# Patient Record
Sex: MALE | Race: ASIAN | Employment: FULL TIME | URBAN - METROPOLITAN AREA
[De-identification: names, ages, dates, MRNs, and addresses within clinical notes are randomized per-mention and may not be internally consistent; named-entity substitution may affect disease eponyms.]

---

## 2017-01-03 ENCOUNTER — OFFICE VISIT (OUTPATIENT)
Dept: INTERNAL MEDICINE | Facility: CLINIC | Age: 27
End: 2017-01-03
Payer: COMMERCIAL

## 2017-01-03 VITALS
HEART RATE: 55 BPM | OXYGEN SATURATION: 98 % | TEMPERATURE: 97.8 F | SYSTOLIC BLOOD PRESSURE: 98 MMHG | DIASTOLIC BLOOD PRESSURE: 74 MMHG | BODY MASS INDEX: 24.69 KG/M2 | HEIGHT: 73 IN | RESPIRATION RATE: 16 BRPM | WEIGHT: 186.3 LBS

## 2017-01-03 DIAGNOSIS — R41.840 DECREASED ATTENTION SPAN: ICD-10-CM

## 2017-01-03 DIAGNOSIS — R41.840 CONCENTRATION DEFICIT: Primary | ICD-10-CM

## 2017-01-03 DIAGNOSIS — F43.23 ADJUSTMENT DISORDER WITH MIXED ANXIETY AND DEPRESSED MOOD: ICD-10-CM

## 2017-01-03 DIAGNOSIS — F98.8 ADD (ATTENTION DEFICIT DISORDER): ICD-10-CM

## 2017-01-03 PROCEDURE — 99213 OFFICE O/P EST LOW 20 MIN: CPT | Performed by: NURSE PRACTITIONER

## 2017-01-03 RX ORDER — DEXTROAMPHETAMINE SACCHARATE, AMPHETAMINE ASPARTATE, DEXTROAMPHETAMINE SULFATE AND AMPHETAMINE SULFATE 1.25; 1.25; 1.25; 1.25 MG/1; MG/1; MG/1; MG/1
5 TABLET ORAL 2 TIMES DAILY
Qty: 60 TABLET | Refills: 0 | Status: SHIPPED | OUTPATIENT
Start: 2017-01-03 | End: 2017-02-01

## 2017-01-03 ASSESSMENT — ANXIETY QUESTIONNAIRES
7. FEELING AFRAID AS IF SOMETHING AWFUL MIGHT HAPPEN: NOT AT ALL
2. NOT BEING ABLE TO STOP OR CONTROL WORRYING: SEVERAL DAYS
5. BEING SO RESTLESS THAT IT IS HARD TO SIT STILL: NOT AT ALL
GAD7 TOTAL SCORE: 2
6. BECOMING EASILY ANNOYED OR IRRITABLE: NOT AT ALL
IF YOU CHECKED OFF ANY PROBLEMS ON THIS QUESTIONNAIRE, HOW DIFFICULT HAVE THESE PROBLEMS MADE IT FOR YOU TO DO YOUR WORK, TAKE CARE OF THINGS AT HOME, OR GET ALONG WITH OTHER PEOPLE: NOT DIFFICULT AT ALL
1. FEELING NERVOUS, ANXIOUS, OR ON EDGE: SEVERAL DAYS
3. WORRYING TOO MUCH ABOUT DIFFERENT THINGS: NOT AT ALL

## 2017-01-03 ASSESSMENT — PATIENT HEALTH QUESTIONNAIRE - PHQ9: 5. POOR APPETITE OR OVEREATING: NOT AT ALL

## 2017-01-03 NOTE — MR AVS SNAPSHOT
"              After Visit Summary   1/3/2017    Wander Dinero    MRN: [id number]           Patient Information     Date Of Birth          1990        Visit Information        Provider Department      1/3/2017 7:40 AM Radha Meneses NP [facility]        Today's Diagnoses     Concentration deficit    -  1     Decreased attention Span         Adjustment disorder with mixed anxiety and depressed mood         ADD (attention deficit disorder)           Care Instructions    adderall refills through Sara Meneses CNP          Follow-ups after your visit        Who to contact     If you have questions or need follow up information about today's clinic visit or your schedule please contact [facility] directly at [phone].  Normal or non-critical lab and imaging results will be communicated to you by MyChart, letter or phone within 4 business days after the clinic has received the results. If you do not hear from us within 7 days, please contact the clinic through MyChart or phone. If you have a critical or abnormal lab result, we will notify you by phone as soon as possible.  Submit refill requests through [email or web address] or call your pharmacy and they will forward the refill request to us. Please allow 3 business days for your refill to be completed.          Additional Information About Your Visit        MyChart Information     [email or web address] lets you send messages to your doctor, view your test results, renew your prescriptions, schedule appointments and more. To sign up, go to [email or web address] . Click on \"Log in\" on the left side of the screen, which will take you to the Welcome page. Then click on \"Sign up Now\" on the right side of the page.     You will be asked to enter the access code listed below, as well as some personal information. Please follow the directions to create your username and password.     Your access code is: [email or web address]  Expires: 1/31/2017  8:17 " "AM     Your access code will  in 90 days. If you need help or a new code, please call your [facility] or [phone].        Your Vitals Were     Pulse Temperature Respirations Height BMI (Body Mass Index) Pulse Oximetry    55 97.8  F (36.6  C) (Oral) 16 6' 1\" (1.854 m) 24.58 kg/m2 98%       Blood Pressure from Last 3 Encounters:   17 98/74   16 116/60   16 124/86    Weight from Last 3 Encounters:   17 186 lb 4.8 oz (84.505 kg)   16 185 lb (83.915 kg)   16 184 lb (83.462 kg)              Today, you had the following     No orders found for display         Where to get your medicines      Some of these will need a paper prescription and others can be bought over the counter.  Ask your nurse if you have questions.     Bring a paper prescription for each of these medications    - amphetamine-dextroamphetamine 5 MG per tablet       Primary Care Provider Office Phone # Fax #    TUYET Welch [facility] [phone]       [facility] [address] [place] [address]        Thank you!     Thank you for choosing [facility]  for your care. Our goal is always to provide you with excellent care. Hearing back from our patients is one way we can continue to improve our services. Please take a few minutes to complete the written survey that you may receive in the mail after your visit with us. Thank you!             Your Updated Medication List - Protect others around you: Learn how to safely use, store and throw away your medicines at www.disposemymeds.org.          This list is accurate as of: 1/3/17  8:13 AM.  Always use your most recent med list.                   Brand Name Dispense Instructions for use    amphetamine-dextroamphetamine 5 MG per tablet    ADDERALL    60 tablet    Take 1 tablet (5 mg) by mouth 2 times daily         "

## 2017-01-03 NOTE — PROGRESS NOTES
SUBJECTIVE:                                                    Wander Dinero is a 26 year old male who presents to clinic today for the following health issues:      F/u depression, anxiety, ADHD  Improved on adderall 5 mg BID      Patient Active Problem List   Diagnosis     Foot fracture, left, sequela     Abnormal gait     Aftercare following surgery of the musculoskeletal system     Ankle injury     ADD (attention deficit disorder)     Controlled substance agreement signed     Past Surgical History   Procedure Laterality Date     Ent surgery  2009     wisdom teeth      Arthroscopy ankle, repair ligament Left 6/20/2016     Procedure: ARTHROSCOPY ANKLE, REPAIR LIGAMENT;  Surgeon: Umberto Das DPM;  Location:  OR       Social History   Substance Use Topics     Smoking status: Never Smoker      Smokeless tobacco: Never Used     Alcohol Use: 0.0 oz/week     0 Standard drinks or equivalent per week      Comment: 2 per week     Family History   Problem Relation Age of Onset     Other - See Comments Mother      healthy      Other - See Comments Father      healthy      DIABETES No family hx of      Coronary Artery Disease No family hx of      Hypertension No family hx of      Breast Cancer No family hx of      Colon Cancer No family hx of      Prostate Cancer No family hx of      Other Cancer No family hx of          Current Outpatient Prescriptions   Medication Sig Dispense Refill     amphetamine-dextroamphetamine (ADDERALL) 5 MG per tablet Take 1 tablet (5 mg) by mouth 2 times daily 60 tablet 0     BP Readings from Last 3 Encounters:   01/03/17 98/74   12/02/16 116/60   08/02/16 124/86    Wt Readings from Last 3 Encounters:   01/03/17 186 lb 4.8 oz (84.505 kg)   12/02/16 185 lb (83.915 kg)   09/06/16 184 lb (83.462 kg)                    ROS:  Constitutional, HEENT, cardiovascular, pulmonary, gi and gu systems are negative, except as otherwise noted.    OBJECTIVE:                                               "      BP 98/74 mmHg  Pulse 55  Temp(Src) 97.8  F (36.6  C) (Oral)  Resp 16  Ht 6' 1\" (1.854 m)  Wt 186 lb 4.8 oz (84.505 kg)  BMI 24.58 kg/m2  SpO2 98%  Body mass index is 24.58 kg/(m^2).  GENERAL: healthy, alert and no distress  PSYCH: mentation appears normal, affect normal/bright         ASSESSMENT/PLAN:                                                              ICD-10-CM    1. Concentration deficit R41.840 amphetamine-dextroamphetamine (ADDERALL) 5 MG per tablet   2. Decreased attention Span R41.840 amphetamine-dextroamphetamine (ADDERALL) 5 MG per tablet   3. Adjustment disorder with mixed anxiety and depressed mood F43.23 amphetamine-dextroamphetamine (ADDERALL) 5 MG per tablet   4. ADD (attention deficit disorder) F98.8 amphetamine-dextroamphetamine (ADDERALL) 5 MG per tablet       Patient Instructions   adderall refills through Sara Meneses CNP      A total of 15 minutes was spent with the patient today, with greater than 50% of the visit involving counseling and coordination of care.      Radha Meneses NP  [facility]  "

## 2017-01-03 NOTE — NURSING NOTE
"Chief Complaint   Patient presents with     Refill Request     adderall       Initial BP 98/74 mmHg  Pulse 55  Temp(Src) 97.8  F (36.6  C) (Oral)  Resp 16  Ht 6' 1\" (1.854 m)  Wt 186 lb 4.8 oz (84.505 kg)  BMI 24.58 kg/m2  SpO2 98% Estimated body mass index is 24.58 kg/(m^2) as calculated from the following:    Height as of this encounter: 6' 1\" (1.854 m).    Weight as of this encounter: 186 lb 4.8 oz (84.505 kg).  BP completed using cuff size: regular  "

## 2017-01-04 ASSESSMENT — ANXIETY QUESTIONNAIRES: GAD7 TOTAL SCORE: 2

## 2017-01-04 ASSESSMENT — PATIENT HEALTH QUESTIONNAIRE - PHQ9: SUM OF ALL RESPONSES TO PHQ QUESTIONS 1-9: 2

## 2017-02-01 ENCOUNTER — OFFICE VISIT (OUTPATIENT)
Dept: INTERNAL MEDICINE | Facility: CLINIC | Age: 27
End: 2017-02-01
Payer: COMMERCIAL

## 2017-02-01 VITALS
OXYGEN SATURATION: 100 % | SYSTOLIC BLOOD PRESSURE: 100 MMHG | HEART RATE: 90 BPM | TEMPERATURE: 98.6 F | WEIGHT: 183.7 LBS | RESPIRATION RATE: 16 BRPM | BODY MASS INDEX: 24.34 KG/M2 | DIASTOLIC BLOOD PRESSURE: 70 MMHG | HEIGHT: 73 IN

## 2017-02-01 DIAGNOSIS — R41.840 DECREASED ATTENTION SPAN: ICD-10-CM

## 2017-02-01 DIAGNOSIS — F98.8 ADD (ATTENTION DEFICIT DISORDER): ICD-10-CM

## 2017-02-01 DIAGNOSIS — R41.840 CONCENTRATION DEFICIT: Primary | ICD-10-CM

## 2017-02-01 DIAGNOSIS — F43.23 ADJUSTMENT DISORDER WITH MIXED ANXIETY AND DEPRESSED MOOD: ICD-10-CM

## 2017-02-01 PROCEDURE — 99213 OFFICE O/P EST LOW 20 MIN: CPT | Performed by: NURSE PRACTITIONER

## 2017-02-01 RX ORDER — DEXTROAMPHETAMINE SACCHARATE, AMPHETAMINE ASPARTATE MONOHYDRATE, DEXTROAMPHETAMINE SULFATE AND AMPHETAMINE SULFATE 2.5; 2.5; 2.5; 2.5 MG/1; MG/1; MG/1; MG/1
10 CAPSULE, EXTENDED RELEASE ORAL DAILY
Qty: 30 CAPSULE | Refills: 0 | Status: SHIPPED | OUTPATIENT
Start: 2017-02-01 | End: 2017-03-01

## 2017-02-01 RX ORDER — DEXTROAMPHETAMINE SACCHARATE, AMPHETAMINE ASPARTATE, DEXTROAMPHETAMINE SULFATE AND AMPHETAMINE SULFATE 1.25; 1.25; 1.25; 1.25 MG/1; MG/1; MG/1; MG/1
TABLET ORAL
Qty: 30 TABLET | Refills: 0 | Status: SHIPPED | OUTPATIENT
Start: 2017-02-01 | End: 2017-03-01

## 2017-02-01 NOTE — NURSING NOTE
"Chief Complaint   Patient presents with     RECHECK     Attention Deficit Disorder       Initial /70 mmHg  Pulse 90  Temp(Src) 98.6  F (37  C) (Oral)  Ht 6' 1\" (1.854 m)  Wt 183 lb 11.2 oz (83.326 kg)  BMI 24.24 kg/m2  SpO2 100% Estimated body mass index is 24.24 kg/(m^2) as calculated from the following:    Height as of this encounter: 6' 1\" (1.854 m).    Weight as of this encounter: 183 lb 11.2 oz (83.326 kg).  BP completed using cuff size: large    "

## 2017-02-01 NOTE — MR AVS SNAPSHOT
"              After Visit Summary   2/1/2017    Wander Dinero    MRN: [id number]           Patient Information     Date Of Birth          1990        Visit Information        Provider Department      2/1/2017 7:00 AM Sara Parsons APRN CNP [facility]        Today's Diagnoses     Concentration deficit    -  1     Decreased attention Span         Adjustment disorder with mixed anxiety and depressed mood         ADD (attention deficit disorder)           Care Instructions    Adderall XR 10 mg daily     May take Adderall 5 mg daily in early afternoon if needed     Follow up with any questions or concerns.               Follow-ups after your visit        Who to contact     If you have questions or need follow up information about today's clinic visit or your schedule please contact [facility] directly at [phone].  Normal or non-critical lab and imaging results will be communicated to you by B2B-Centerhart, letter or phone within 4 business days after the clinic has received the results. If you do not hear from us within 7 days, please contact the clinic through B2B-Centerhart or phone. If you have a critical or abnormal lab result, we will notify you by phone as soon as possible.  Submit refill requests through SocialExpress or call your pharmacy and they will forward the refill request to us. Please allow 3 business days for your refill to be completed.          Additional Information About Your Visit        [email or web address] Information     SocialExpress lets you send messages to your doctor, view your test results, renew your prescriptions, schedule appointments and more. To sign up, go to [email or web address] . Click on \"Log in\" on the left side of the screen, which will take you to the Welcome page. Then click on \"Sign up Now\" on the right side of the page.     You will be asked to enter the access code listed below, as well as some personal information. Please follow the directions to create your " "username and password.     Your access code is: [email or web address]  Expires: 2017  7:33 AM     Your access code will  in 90 days. If you need help or a new code, please call your [facility] or [phone].        Care EveryWhere ID     This is your Care EveryWhere ID. This could be used by other organizations to access your [facility] medical records  [id number]        Your Vitals Were     Pulse Temperature Respirations Height BMI (Body Mass Index) Pulse Oximetry    90 98.6  F (37  C) (Oral) 16 6' 1\" (1.854 m) 24.24 kg/m2 100%       Blood Pressure from Last 3 Encounters:   17 100/70   17 98/74   16 116/60    Weight from Last 3 Encounters:   17 183 lb 11.2 oz (83.326 kg)   17 186 lb 4.8 oz (84.505 kg)   16 185 lb (83.915 kg)              Today, you had the following     No orders found for display         Today's Medication Changes          These changes are accurate as of: 17  7:33 AM.  If you have any questions, ask your nurse or doctor.               These medicines have changed or have updated prescriptions.        Dose/Directions    * amphetamine-dextroamphetamine 5 MG per tablet   Commonly known as:  ADDERALL   This may have changed:    - how much to take  - how to take this  - when to take this  - additional instructions   Used for:  Concentration deficit, Decreased attention Span, Adjustment disorder with mixed anxiety and depressed mood, ADD (attention deficit disorder)   Changed by:  Sara Parsons APRN CNP        May take 1 tab in early afternoon if needed   Quantity:  30 tablet   Refills:  0       * amphetamine-dextroamphetamine 10 MG per 24 hr capsule   Commonly known as:  ADDERALL XR   This may have changed:  You were already taking a medication with the same name, and this prescription was added. Make sure you understand how and when to take each.   Used for:  ADD (attention deficit disorder), Adjustment disorder with mixed anxiety and depressed " mood, Decreased attention Span, Concentration deficit   Changed by:  Sara Parsons APRN CNP        Dose:  10 mg   Take 1 capsule (10 mg) by mouth daily   Quantity:  30 capsule   Refills:  0       * Notice:  This list has 2 medication(s) that are the same as other medications prescribed for you. Read the directions carefully, and ask your doctor or other care provider to review them with you.         Where to get your medicines      Some of these will need a paper prescription and others can be bought over the counter.  Ask your nurse if you have questions.     Bring a paper prescription for each of these medications    - amphetamine-dextroamphetamine 10 MG per 24 hr capsule  - amphetamine-dextroamphetamine 5 MG per tablet             Primary Care Provider Office Phone # Fax #    TUYET Welch [phone]       [facility] [address] [facility] [address]        Thank you!     Thank you for choosing [facility]  for your care. Our goal is always to provide you with excellent care. Hearing back from our patients is one way we can continue to improve our services. Please take a few minutes to complete the written survey that you may receive in the mail after your visit with us. Thank you!             Your Updated Medication List - Protect others around you: Learn how to safely use, store and throw away your medicines at www.disposemymeds.org.          This list is accurate as of: 2/1/17  7:33 AM.  Always use your most recent med list.                   Brand Name Dispense Instructions for use    * amphetamine-dextroamphetamine 5 MG per tablet    ADDERALL    30 tablet    May take 1 tab in early afternoon if needed       * amphetamine-dextroamphetamine 10 MG per 24 hr capsule    ADDERALL XR    30 capsule    Take 1 capsule (10 mg) by mouth daily       * Notice:  This list has 2 medication(s) that are the same as other medications prescribed for you.  Read the directions carefully, and ask your doctor or other care provider to review them with you.

## 2017-02-01 NOTE — PATIENT INSTRUCTIONS
Adderall XR 10 mg daily     May take Adderall 5 mg daily in early afternoon if needed     Follow up with any questions or concerns.

## 2017-02-01 NOTE — PROGRESS NOTES
"  SUBJECTIVE:                                                    Wander Dinero is a 26 year old male who presents to clinic today for the following health issues:    Follow up ADD.  Doing well on adderall   December to January 1 in morning and one at 12 and worked well   Curbed appetite  Then about 3-4 weeks taking a little earlier 1130-12    Feels like the morning dose does not last as well     Will try extended release with a boost in afternoon    Problem list and histories reviewed & adjusted, as indicated.  Additional history:     Patient Active Problem List   Diagnosis     Foot fracture, left, sequela     ADD (attention deficit disorder)     Controlled substance agreement signed     Past Surgical History   Procedure Laterality Date     Ent surgery  2009     wisdom teeth      Arthroscopy ankle, repair ligament Left 6/20/2016     Procedure: ARTHROSCOPY ANKLE, REPAIR LIGAMENT;  Surgeon: Umberto Das DPM;  Location:  OR       Social History   Substance Use Topics     Smoking status: Never Smoker      Smokeless tobacco: Never Used     Alcohol Use: 0.0 oz/week     0 Standard drinks or equivalent per week      Comment: 2 per week     Family History   Problem Relation Age of Onset     Other - See Comments Mother      healthy      Other - See Comments Father      healthy      DIABETES No family hx of      Coronary Artery Disease No family hx of      Hypertension No family hx of      Breast Cancer No family hx of      Colon Cancer No family hx of      Prostate Cancer No family hx of      Other Cancer No family hx of            ROS:  Constitutional, HEENT, cardiovascular, pulmonary, GI, , musculoskeletal, neuro, skin, endocrine and psych systems are negative, except as otherwise noted.    OBJECTIVE:                                                    /70 mmHg  Pulse 90  Temp(Src) 98.6  F (37  C) (Oral)  Resp 16  Ht 6' 1\" (1.854 m)  Wt 183 lb 11.2 oz (83.326 kg)  BMI 24.24 kg/m2  SpO2 100%  Body mass " index is 24.24 kg/(m^2).  GENERAL: alert and no distress  RESP: lungs clear to auscultation - no rales, rhonchi or wheezes  CV: regular rate and rhythm  ABDOMEN: soft, nontender,  and bowel sounds normal  MS: no gross musculoskeletal defects noted, no edema  NEURO: Normal strength and tone, mentation intact and speech normal  PSYCH: mentation appears normal, affect normal/bright    Diagnostic Test Results:  none      ASSESSMENT/PLAN:                                                              ICD-10-CM    1. Concentration deficit R41.840 amphetamine-dextroamphetamine (ADDERALL) 5 MG per tablet     amphetamine-dextroamphetamine (ADDERALL XR) 10 MG per 24 hr capsule   2. Decreased attention Span R41.840 amphetamine-dextroamphetamine (ADDERALL) 5 MG per tablet     amphetamine-dextroamphetamine (ADDERALL XR) 10 MG per 24 hr capsule   3. Adjustment disorder with mixed anxiety and depressed mood F43.23 amphetamine-dextroamphetamine (ADDERALL) 5 MG per tablet     amphetamine-dextroamphetamine (ADDERALL XR) 10 MG per 24 hr capsule   4. ADD (attention deficit disorder) F98.8 amphetamine-dextroamphetamine (ADDERALL) 5 MG per tablet     amphetamine-dextroamphetamine (ADDERALL XR) 10 MG per 24 hr capsule       Patient Instructions   Adderall XR 10 mg daily     May take Adderall 5 mg daily in early afternoon if needed     Follow up with any questions or concerns.               TUYET Welch [facility]

## 2017-03-01 ENCOUNTER — OFFICE VISIT (OUTPATIENT)
Dept: INTERNAL MEDICINE | Facility: CLINIC | Age: 27
End: 2017-03-01
Payer: COMMERCIAL

## 2017-03-01 ENCOUNTER — OFFICE VISIT (OUTPATIENT)
Dept: FAMILY MEDICINE | Facility: CLINIC | Age: 27
End: 2017-03-01
Payer: COMMERCIAL

## 2017-03-01 VITALS
DIASTOLIC BLOOD PRESSURE: 60 MMHG | RESPIRATION RATE: 12 BRPM | OXYGEN SATURATION: 97 % | SYSTOLIC BLOOD PRESSURE: 116 MMHG | HEIGHT: 73 IN | BODY MASS INDEX: 24.12 KG/M2 | HEART RATE: 56 BPM | TEMPERATURE: 97.8 F | WEIGHT: 182 LBS

## 2017-03-01 DIAGNOSIS — L70.0 ACNE VULGARIS: ICD-10-CM

## 2017-03-01 DIAGNOSIS — F98.8 ADD (ATTENTION DEFICIT DISORDER): Primary | ICD-10-CM

## 2017-03-01 DIAGNOSIS — B36.0 TINEA VERSICOLOR: ICD-10-CM

## 2017-03-01 DIAGNOSIS — L81.9 HYPERPIGMENTATION OF SKIN: Primary | ICD-10-CM

## 2017-03-01 PROCEDURE — 99213 OFFICE O/P EST LOW 20 MIN: CPT | Performed by: NURSE PRACTITIONER

## 2017-03-01 PROCEDURE — 99213 OFFICE O/P EST LOW 20 MIN: CPT | Performed by: FAMILY MEDICINE

## 2017-03-01 RX ORDER — DEXTROAMPHETAMINE SACCHARATE, AMPHETAMINE ASPARTATE, DEXTROAMPHETAMINE SULFATE AND AMPHETAMINE SULFATE 2.5; 2.5; 2.5; 2.5 MG/1; MG/1; MG/1; MG/1
10 TABLET ORAL 2 TIMES DAILY
Qty: 60 TABLET | Refills: 0 | Status: SHIPPED | OUTPATIENT
Start: 2017-03-01 | End: 2017-03-01

## 2017-03-01 RX ORDER — DEXTROAMPHETAMINE SACCHARATE, AMPHETAMINE ASPARTATE, DEXTROAMPHETAMINE SULFATE AND AMPHETAMINE SULFATE 1.25; 1.25; 1.25; 1.25 MG/1; MG/1; MG/1; MG/1
10 TABLET ORAL 2 TIMES DAILY
Refills: 0 | COMMUNITY
Start: 2017-02-01 | End: 2018-05-23

## 2017-03-01 RX ORDER — ADAPALENE 0.1 G/100G
CREAM TOPICAL AT BEDTIME
Qty: 45 G | Refills: 11 | Status: SHIPPED | OUTPATIENT
Start: 2017-03-01 | End: 2018-05-23

## 2017-03-01 RX ORDER — CLINDAMYCIN PHOSPHATE 10 UG/ML
LOTION TOPICAL 2 TIMES DAILY
Qty: 60 ML | Refills: 3 | Status: SHIPPED | OUTPATIENT
Start: 2017-03-01 | End: 2018-05-23

## 2017-03-01 RX ORDER — CHOLECALCIFEROL (VITAMIN D3) 50 MCG
2000 TABLET ORAL DAILY
Qty: 100 TABLET | Refills: 3 | COMMUNITY
Start: 2017-03-01

## 2017-03-01 RX ORDER — FLUCONAZOLE 150 MG/1
150 TABLET ORAL
Qty: 4 TABLET | Refills: 0 | Status: CANCELLED | OUTPATIENT
Start: 2017-03-01

## 2017-03-01 NOTE — PROGRESS NOTES
[facility] - PRIMARY CARE SKIN    CC : scarring on face  SUBJECTIVE:                                                    Wander Dinero is a 26 year old male who presents to clinic today because of scarring on face, resultant from acne. Acne control is satisfactory at this time, even though he reports constant development of acne on the face. He has picked at pimples on the face.    Symptoms have been ongoing for : years.  The acne is primarily located on the : face.  Acne generally presents as : pimple(s).    Current acne treatment : OTC acne cleanser (with charcoal).    Issue Two : Tinea versicolor  He has also noted blotchy skin discoloration on the back and chest. It is not itchy.    Refer to electronic medical record (EMR) for past medical history and medications.    INTEGUMENTARY/SKIN: POSITIVE for acne, pigmentation  ROS : 14 point review of systems was negative except the symptoms listed above in the HPI.    This document serves as a record of the services and decisions personally performed and made by Archana John MD. It was created on her behalf by Mitesh Vincent, a trained medical scribe.  The creation of this document is based on the scribe's personal observations and the provider's statements to the medical scribe.  Mitesh Vincent, March 1, 2017 1:56 PM      OBJECTIVE:                                                    GENERAL: healthy, alert and no distress  SKIN: [place] Skin Type - V.  Face were examined. The dermatoscope was used to help evaluate pigmented lesions.  Skin Pertinent Findings:  Face: Resolving nodular acneiform lesion on right cheek. Residual hyperpigmentation on upper forehead. Residual scarring from previous acne lesions.    Scattered macular patches of hypopigmentation and hyperpigmentation on back with slight distribution on abdomen and chest.    Diagnostic Test Results:  none     MDM : . Discussed pathophysiology of acne, treatment options, and expectations for treatment  "response.      ASSESSMENT:                                                      Encounter Diagnoses   Name Primary?     Hyperpigmentation of skin Yes     Tinea versicolor      Acne vulgaris          PLAN:                                                    Patient Instructions   FUTURE APPOINTMENTS  Follow up in 2 months for re-evaluation of acne.    Do not pick at acne as that creates more inflammation.    ACNE TREATMENT PLAN  Morning or Evening (whenever you shower) :    OTC cleanser with benzoyl peroxide 5% wash (not gel form). Note - Benzoyl peroxide washes can bleach clothing, so try to use disposable or old towels and clothes.    Do a \"20/10\" wash (20 seconds of skin contact with the cleanser followed by a 10 second rinse)    Apply to face.    After cleansing, medication : apply topical Clindamycin 1% lotion to face.    Evening or Morning (other time of day) :    Cetaphil facial cleanser - Do a \"20 / 10 wash\" again.    Bedtime :    Lastly, before going to bed, apply topical Adapalene 0.1% cream.    Wait until face is dry after cleansing before application.    Use just a pea-sized amount for application.    Recommendations if skin becomes too dry in response to medication:    Use Cetaphil facial moisturizer.    Alternate application of Adapalene cream every other night for 2 weeks, to condition the skin. After 2 weeks, retry nightly application.    Decrease use of Benzaderm or Benzoyl Peroxide wash to once per day. If still too irritating, decrease benzoyl peroxide product to 5%.    Recommended brands include: PanOxyl 4% creamy wash, Clean & Clear Advantage oil absorbing 5.5% cleanser, Neutrogena Clear Pore 3.7% daily scrub, [employer] 4% creamy face wash. Topix Benzaderm (changing name to Replenix) 5% wash can be found on amazon.com.    TINEA VERSICOLOR INSTRUCTIONS  It is very common for this to occur with exercise or hot weather. If this continues to be a problem, Selsun blue, ketoconazole or another shampoo may be " used as a body wash once a week or so to prevent it from coming back.        PROCEDURES:                                                    None.    TT : 25 minutes.  CT : 15 minutes.      The information in this document, created by the medical scribe for me, accurately reflects the services I personally performed and the decisions made by me. I have reviewed and approved this document for accuracy prior to leaving the patient care area.  Archana John MD March 1, 2017 1:56 PM  [facility] - PRIMARY CARE SKIN

## 2017-03-01 NOTE — PATIENT INSTRUCTIONS
"FUTURE APPOINTMENTS  Follow up in 2 months for re-evaluation of acne.    Do not pick at acne as that creates more inflammation.    ACNE TREATMENT PLAN  Morning or Evening (whenever you shower) :    OTC cleanser with benzoyl peroxide 5% wash (not gel form). Note - Benzoyl peroxide washes can bleach clothing, so try to use disposable or old towels and clothes.    Do a \"20/10\" wash (20 seconds of skin contact with the cleanser followed by a 10 second rinse)    Apply to face.    After cleansing, medication : apply topical Clindamycin 1% lotion to face.    Evening or Morning (other time of day) :    Cetaphil facial cleanser - Do a \"20 / 10 wash\" again.    Bedtime :    Lastly, before going to bed, apply topical Adapalene 0.1% cream.    Wait until face is dry after cleansing before application.    Use just a pea-sized amount for application.    Recommendations if skin becomes too dry in response to medication:    Use Cetaphil facial moisturizer.    Alternate application of Adapalene cream every other night for 2 weeks, to condition the skin. After 2 weeks, retry nightly application.    Decrease use of Benzaderm or Benzoyl Peroxide wash to once per day. If still too irritating, decrease benzoyl peroxide product to 5%.    Recommended brands include: PanOxyl 4% creamy wash, Clean & Clear Advantage oil absorbing 5.5% cleanser, Neutrogena Clear Pore 3.7% daily scrub, [employer] 4% creamy face wash. Topix Benzaderm (changing name to Replenix) 5% wash can be found on amazon.com.    TINEA VERSICOLOR INSTRUCTIONS  It is very common for this to occur with exercise or hot weather. If this continues to be a problem, Selsun blue, ketoconazole or another shampoo may be used as a body wash once a week or so to prevent it from coming back.  "

## 2017-03-01 NOTE — MR AVS SNAPSHOT
After Visit Summary   3/1/2017    Wander Dinero    MRN: [id number]           Patient Information     Date Of Birth          1990        Visit Information        Provider Department      3/1/2017 7:20 AM Sara Parsons APRN CNP [facility]        Today's Diagnoses     ADD (attention deficit disorder)    -  1      Care Instructions    Will try adderall immediate release and do 10 mg up to twice daily     Follow up in a month        Follow-ups after your visit        Your next 10 appointments already scheduled     Mar 01, 2017  2:20 PM CST   Office Visit with Nanda John MD   [facility] ([facility] )    [address] [place] [id number] [phone]           Bring a current list of meds and any records pertaining to this visit.  For Physicals, please bring immunization records and any forms needing to be filled out.  Please arrive 10 minutes early to complete paperwork.              Who to contact     If you have questions or need follow up information about today's clinic visit or your schedule please contact [facility] directly at [phone].  Normal or non-critical lab and imaging results will be communicated to you by MyChart, letter or phone within 4 business days after the clinic has received the results. If you do not hear from us within 7 days, please contact the clinic through Xinronghart or phone. If you have a critical or abnormal lab result, we will notify you by phone as soon as possible.  Submit refill requests through Shopatron or call your pharmacy and they will forward the refill request to us. Please allow 3 business days for your refill to be completed.          Additional Information About Your Visit        MyChart Information     Shopatron lets you send messages to your doctor, view your test results, renew your prescriptions, schedule appointments and  "more. To sign up, go to [email or web address] . Click on \"Log in\" on the left side of the screen, which will take you to the Welcome page. Then click on \"Sign up Now\" on the right side of the page.     You will be asked to enter the access code listed below, as well as some personal information. Please follow the directions to create your username and password.     Your access code is: [email or web address]  Expires: 2017  7:33 AM     Your access code will  in 90 days. If you need help or a new code, please call your [facility] clinic or [phone].        Care EveryWhere ID     This is your Care EveryWhere ID. This could be used by other organizations to access your [facility] medical records  [id number]        Your Vitals Were     Pulse Temperature Respirations Height Pulse Oximetry BMI (Body Mass Index)    56 97.8  F (36.6  C) (Oral) 12 6' 1\" (1.854 m) 97% 24.01 kg/m2       Blood Pressure from Last 3 Encounters:   17 116/60   17 100/70   17 98/74    Weight from Last 3 Encounters:   17 182 lb (82.6 kg)   17 183 lb 11.2 oz (83.3 kg)   17 186 lb 4.8 oz (84.5 kg)              Today, you had the following     No orders found for display         Today's Medication Changes          These changes are accurate as of: 3/1/17 11:09 AM.  If you have any questions, ask your nurse or doctor.               Start taking these medicines.        Dose/Directions    amphetamine-dextroamphetamine 10 MG per tablet   Commonly known as:  ADDERALL   Used for:  ADD (attention deficit disorder)   Replaces:  amphetamine-dextroamphetamine 5 MG per tablet   Started by:  Sara Parsons APRN CNP        Dose:  10 mg   Take 1 tablet (10 mg) by mouth 2 times daily Take second dose before 2 pm   Quantity:  60 tablet   Refills:  0         Stop taking these medicines if you haven't already. Please contact your care team if you have questions.     amphetamine-dextroamphetamine 10 MG per 24 hr capsule "   Commonly known as:  ADDERALL XR   Stopped by:  Sara Parsons APRN CNP           amphetamine-dextroamphetamine 5 MG per tablet   Commonly known as:  ADDERALL   Replaced by:  amphetamine-dextroamphetamine 10 MG per tablet   Stopped by:  Sara Parsons APRN CNP                Where to get your medicines      Some of these will need a paper prescription and others can be bought over the counter.  Ask your nurse if you have questions.     Bring a paper prescription for each of these medications     amphetamine-dextroamphetamine 10 MG per tablet                Primary Care Provider Office Phone # Fax #    TUYET Welhc [phone]       [facility] [address] [place] [address]        Thank you!     Thank you for choosing [facility]  for your care. Our goal is always to provide you with excellent care. Hearing back from our patients is one way we can continue to improve our services. Please take a few minutes to complete the written survey that you may receive in the mail after your visit with us. Thank you!             Your Updated Medication List - Protect others around you: Learn how to safely use, store and throw away your medicines at www.disposemymeds.org.          This list is accurate as of: 3/1/17 11:09 AM.  Always use your most recent med list.                   Brand Name Dispense Instructions for use    amphetamine-dextroamphetamine 10 MG per tablet    ADDERALL    60 tablet    Take 1 tablet (10 mg) by mouth 2 times daily Take second dose before 2 pm       vitamin D 2000 UNITS tablet     100 tablet    Take 2,000 Units by mouth daily

## 2017-03-01 NOTE — MR AVS SNAPSHOT
"              After Visit Summary   3/1/2017    Wander Dinero    MRN: [id number]           Patient Information     Date Of Birth          1990        Visit Information        Provider Department      3/1/2017 2:20 PM Nanda John MD [facility]        Today's Diagnoses     Hyperpigmentation of skin    -  1    Tinea versicolor        Acne vulgaris          Care Instructions    FUTURE APPOINTMENTS  Follow up in 2 months for re-evaluation of acne.    Do not pick at acne as that creates more inflammation.    ACNE TREATMENT PLAN  Morning or Evening (whenever you shower) :    OTC cleanser with benzoyl peroxide 5% wash (not gel form). Note - Benzoyl peroxide washes can bleach clothing, so try to use disposable or old towels and clothes.    Do a \"20/10\" wash (20 seconds of skin contact with the cleanser followed by a 10 second rinse)    Apply to face.    After cleansing, medication : apply topical Clindamycin 1% lotion to face.    Evening or Morning (other time of day) :    Cetaphil facial cleanser - Do a \"20 / 10 wash\" again.    Bedtime :    Lastly, before going to bed, apply topical Adapalene 0.1% cream.    Wait until face is dry after cleansing before application.    Use just a pea-sized amount for application.    Recommendations if skin becomes too dry in response to medication:    Use Cetaphil facial moisturizer.    Alternate application of Adapalene cream every other night for 2 weeks, to condition the skin. After 2 weeks, retry nightly application.    Decrease use of Benzaderm or Benzoyl Peroxide wash to once per day. If still too irritating, decrease benzoyl peroxide product to 5%.    Recommended brands include: PanOxyl 4% creamy wash, Clean & Clear Advantage oil absorbing 5.5% cleanser, Neutrogena Clear Pore 3.7% daily scrub, CVS 4% creamy face wash. Topix Benzaderm (changing name to Replenix) 5% wash can be found on amazon.com.    TINEA VERSICOLOR INSTRUCTIONS  It is very " "common for this to occur with exercise or hot weather. If this continues to be a problem, Selsun blue, ketoconazole or another shampoo may be used as a body wash once a week or so to prevent it from coming back.        Follow-ups after your visit        Follow-up notes from your care team     Return in about 2 months (around 5/1/2017).      Your next 10 appointments already scheduled     Mar 01, 2017  2:20 PM CST   Office Visit with Nanda John MD   [facility] ([facility] )    [address] [place] [address]  [address] [place] [id number] [phone]           Bring a current list of meds and any records pertaining to this visit.  For Physicals, please bring immunization records and any forms needing to be filled out.  Please arrive 10 minutes early to complete paperwork.              Who to contact     If you have questions or need follow up information about today's clinic visit or your schedule please contact [facility] directly at [phone].  Normal or non-critical lab and imaging results will be communicated to you by Choisrhart, letter or phone within 4 business days after the clinic has received the results. If you do not hear from us within 7 days, please contact the clinic through TakeCharget or phone. If you have a critical or abnormal lab result, we will notify you by phone as soon as possible.  Submit refill requests through [email or web address] or call your pharmacy and they will forward the refill request to us. Please allow 3 business days for your refill to be completed.          Additional Information About Your Visit        ChoisrharMic Network Information     [email or web address] lets you send messages to your doctor, view your test results, renew your prescriptions, schedule appointments and more. To sign up, go to [email or web address] . Click on \"Log in\" on the left side of the screen, which will take you to the Welcome page. Then click on " "\"Sign up Now\" on the right side of the page.     You will be asked to enter the access code listed below, as well as some personal information. Please follow the directions to create your username and password.     Your access code is: [email or web address]  Expires: 2017  7:33 AM     Your access code will  in 90 days. If you need help or a new code, please call your [facility] clinic or [phone].        Care EveryWhere ID     This is your Care EveryWhere ID. This could be used by other organizations to access your [facility] medical records  [id number]         Blood Pressure from Last 3 Encounters:   17 116/60   17 100/70   17 98/74    Weight from Last 3 Encounters:   17 182 lb (82.6 kg)   17 183 lb 11.2 oz (83.3 kg)   17 186 lb 4.8 oz (84.5 kg)              Today, you had the following     No orders found for display         Today's Medication Changes          These changes are accurate as of: 3/1/17  2:08 PM.  If you have any questions, ask your nurse or doctor.               Start taking these medicines.        Dose/Directions    adapalene 0.1 % cream   Commonly known as:  DIFFERIN   Used for:  Acne vulgaris   Started by:  Nanda John MD        Apply topically At Bedtime   Quantity:  45 g   Refills:  11       clindamycin 1 % lotion   Commonly known as:  CLEOCIN T   Used for:  Acne vulgaris   Started by:  Nanda John MD        Apply topically 2 times daily   Quantity:  60 mL   Refills:  3         These medicines have changed or have updated prescriptions.        Dose/Directions    amphetamine-dextroamphetamine 5 MG per tablet   Commonly known as:  ADDERALL   This may have changed:  Another medication with the same name was removed. Continue taking this medication, and follow the directions you see here.   Changed by:  Nanda John MD        Dose:  10 mg   Take 10 mg by mouth 2 times daily   Refills:  0            Where to get your " medicines      These medications were sent to [id number] - [place], [place] - [address]  [address], [facility] [address]     Phone:  [phone]     adapalene 0.1 % cream    clindamycin 1 % lotion                Primary Care Provider Office Phone # Fax #    Sara Martel Jaqueline APRJACQUI CANDELARIO [phone]       [facility] [address]  [facility] [address]        Thank you!     Thank you for choosing [facility]  for your care. Our goal is always to provide you with excellent care. Hearing back from our patients is one way we can continue to improve our services. Please take a few minutes to complete the written survey that you may receive in the mail after your visit with us. Thank you!             Your Updated Medication List - Protect others around you: Learn how to safely use, store and throw away your medicines at www.disposemymeds.org.          This list is accurate as of: 3/1/17  2:08 PM.  Always use your most recent med list.                   Brand Name Dispense Instructions for use    adapalene 0.1 % cream    DIFFERIN    45 g    Apply topically At Bedtime       amphetamine-dextroamphetamine 5 MG per tablet    ADDERALL     Take 10 mg by mouth 2 times daily       clindamycin 1 % lotion    CLEOCIN T    60 mL    Apply topically 2 times daily       vitamin D 2000 UNITS tablet     100 tablet    Take 2,000 Units by mouth daily

## 2017-03-01 NOTE — PROGRESS NOTES
SUBJECTIVE:                                                    Wander Dinero is a 26 year old male who presents to clinic today for the following health issues:      Medication Followup of Adderall    Taking Medication as prescribed: yes    Taking 10 mg XL in morning  And 5 mg in afternoon      Side Effects:  None    Medication Helping Symptoms:  Not really    Last 2 weeks combined 10 and 5 mg did not work as well as when he did the 5 and 5 mg     Helping some but seemed to taper off sooner than expected     No sleeping issues     Would like to try higher dose of immediate release                Problem list and histories reviewed & adjusted, as indicated.  Additional history:     Patient Active Problem List   Diagnosis     Foot fracture, left, sequela     ADD (attention deficit disorder)     Controlled substance agreement signed     Past Surgical History   Procedure Laterality Date     Ent surgery  2009     wisdom teeth      Arthroscopy ankle, repair ligament Left 6/20/2016     Procedure: ARTHROSCOPY ANKLE, REPAIR LIGAMENT;  Surgeon: Umberto Das DPM;  Location:  OR       Social History   Substance Use Topics     Smoking status: Never Smoker     Smokeless tobacco: Never Used     Alcohol use 0.0 oz/week     0 Standard drinks or equivalent per week      Comment: 2 per week     Family History   Problem Relation Age of Onset     Other - See Comments Mother      healthy      Other - See Comments Father      healthy      DIABETES No family hx of      Coronary Artery Disease No family hx of      Hypertension No family hx of      Breast Cancer No family hx of      Colon Cancer No family hx of      Prostate Cancer No family hx of      Other Cancer No family hx of            Reviewed and updated as needed this visit by clinical staff  Tobacco  Allergies  Meds  Soc Hx      Reviewed and updated as needed this visit by Provider         ROS:  Constitutional, HEENT, cardiovascular, pulmonary, GI, ,  "musculoskeletal, neuro, skin, endocrine and psych systems are negative, except as otherwise noted.    OBJECTIVE:                                                    /60 (BP Location: Left arm, Patient Position: Chair, Cuff Size: Adult Large)  Pulse 56  Temp 97.8  F (36.6  C) (Oral)  Resp 12  Ht 6' 1\" (1.854 m)  Wt 182 lb (82.6 kg)  SpO2 97%  BMI 24.01 kg/m2  Body mass index is 24.01 kg/(m^2).  GENERAL:  alert and no distress  RESP: lungs clear   CV: regular rate and rhythm  MS: no gross musculoskeletal defects noted, no edema  NEURO: Normal strength and tone, mentation intact and speech normal  PSYCH: mentation appears normal, affect normal/bright    Diagnostic Test Results:  none      ASSESSMENT/PLAN:                                                              ICD-10-CM    1. ADD (attention deficit disorder) F98.8 amphetamine-dextroamphetamine (ADDERALL) 10 MG per tablet       Patient Instructions   Will try adderall immediate release and do 10 mg up to twice daily     Follow up in a month      TUYET Welch [facility]    "

## 2017-03-01 NOTE — NURSING NOTE
"Chief Complaint   Patient presents with     Recheck Medication       Initial /60 (BP Location: Left arm, Patient Position: Chair, Cuff Size: Adult Large)  Pulse 56  Temp 97.8  F (36.6  C) (Oral)  Resp 12  Ht 6' 1\" (1.854 m)  Wt 182 lb (82.6 kg)  SpO2 97%  BMI 24.01 kg/m2 Estimated body mass index is 24.01 kg/(m^2) as calculated from the following:    Height as of this encounter: 6' 1\" (1.854 m).    Weight as of this encounter: 182 lb (82.6 kg).  Medication Reconciliation: complete     AGUSTIN Carey      "

## 2017-04-04 DIAGNOSIS — F98.8 ADD (ATTENTION DEFICIT DISORDER): Primary | ICD-10-CM

## 2017-04-04 RX ORDER — DEXTROAMPHETAMINE SACCHARATE, AMPHETAMINE ASPARTATE, DEXTROAMPHETAMINE SULFATE AND AMPHETAMINE SULFATE 2.5; 2.5; 2.5; 2.5 MG/1; MG/1; MG/1; MG/1
10 TABLET ORAL 2 TIMES DAILY
Qty: 60 TABLET | Refills: 0 | Status: SHIPPED | OUTPATIENT
Start: 2017-04-04 | End: 2017-05-23

## 2017-04-04 NOTE — TELEPHONE ENCOUNTER
Reason for Call:  Medication or medication refill:    Do you use a [facility]?  Name of the pharmacy and phone number for the current request:  [facility], [address]    Name of the medication requested: amphetamine-dextroamphetamine (ADDERALL) 5 MG per tablet    Other request: Please call patient once Rx is taken to the pharmacy    Can we leave a detailed message on this number? YES    Phone number patient can be reached at: Cell number on file:    Telephone Information:   Mobile [phone]       Best Time: any    Call taken on 4/4/2017 at 12:54 PM by Nimisha Mejia

## 2017-04-04 NOTE — TELEPHONE ENCOUNTER
Per [facility]:    Adderall 10mg      Last Written Prescription Date:  3-1-17  Last Fill Quantity: 60,   # refills: 0  Last Office Visit with [facility] prescribing provider: 3-1-17  Future Office visit:       Routing refill request to provider for review/approval because:  Drug not on the [facility] refill protocol or controlled substance    Signed CSA form in chart.    Please hand carry to [facility].    Please advise, thanks.

## 2017-05-10 ENCOUNTER — HOSPITAL ENCOUNTER (EMERGENCY)
Facility: CLINIC | Age: 27
Discharge: LEFT BEFORE DISCHARGE | End: 2017-05-10
Admitting: EMERGENCY MEDICINE
Payer: COMMERCIAL

## 2017-05-10 VITALS
TEMPERATURE: 98.3 F | SYSTOLIC BLOOD PRESSURE: 130 MMHG | OXYGEN SATURATION: 99 % | WEIGHT: 180 LBS | DIASTOLIC BLOOD PRESSURE: 66 MMHG | RESPIRATION RATE: 16 BRPM | HEART RATE: 65 BPM | BODY MASS INDEX: 23.75 KG/M2

## 2017-05-10 PROCEDURE — 40000268 ZZH STATISTIC NO CHARGES
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
## 2017-05-23 DIAGNOSIS — F98.8 ADD (ATTENTION DEFICIT DISORDER): Primary | ICD-10-CM

## 2017-05-23 RX ORDER — DEXTROAMPHETAMINE SACCHARATE, AMPHETAMINE ASPARTATE, DEXTROAMPHETAMINE SULFATE AND AMPHETAMINE SULFATE 1.25; 1.25; 1.25; 1.25 MG/1; MG/1; MG/1; MG/1
10 TABLET ORAL 2 TIMES DAILY
Refills: 0 | Status: CANCELLED | OUTPATIENT
Start: 2017-05-23

## 2017-05-23 RX ORDER — DEXTROAMPHETAMINE SACCHARATE, AMPHETAMINE ASPARTATE, DEXTROAMPHETAMINE SULFATE AND AMPHETAMINE SULFATE 2.5; 2.5; 2.5; 2.5 MG/1; MG/1; MG/1; MG/1
10 TABLET ORAL 2 TIMES DAILY
Qty: 60 TABLET | Refills: 0 | Status: SHIPPED | OUTPATIENT
Start: 2017-05-23 | End: 2017-07-03

## 2017-05-23 NOTE — TELEPHONE ENCOUNTER
Amphetamine      Last Written Prescription Date:  4/4/17  Last Fill Quantity: 60,   # refills: 0  Last Office Visit with [facility] prescribing provider: 3/1/17  Future Office visit:       Routing refill request to provider for review/approval because:  Drug not on the [facility] refill protocol or controlled substance

## 2017-07-03 DIAGNOSIS — F98.8 ATTENTION DEFICIT DISORDER, UNSPECIFIED HYPERACTIVITY PRESENCE: Primary | ICD-10-CM

## 2017-07-03 RX ORDER — DEXTROAMPHETAMINE SACCHARATE, AMPHETAMINE ASPARTATE, DEXTROAMPHETAMINE SULFATE AND AMPHETAMINE SULFATE 2.5; 2.5; 2.5; 2.5 MG/1; MG/1; MG/1; MG/1
10 TABLET ORAL 2 TIMES DAILY
Qty: 60 TABLET | Refills: 0 | Status: SHIPPED | OUTPATIENT
Start: 2017-07-03 | End: 2017-08-02

## 2017-07-03 NOTE — TELEPHONE ENCOUNTER
Adderall      Last Written Prescription Date:  5/23/17  Last Fill Quantity: 60,   # refills: 0  Last Office Visit with [facility] provider: 3/1/17  Future Office visit:       Routing refill request to provider for review/approval because:  Drug not on the [facility] refill protocol or controlled substance

## 2017-08-02 DIAGNOSIS — F98.8 ATTENTION DEFICIT DISORDER, UNSPECIFIED HYPERACTIVITY PRESENCE: ICD-10-CM

## 2017-08-02 RX ORDER — DEXTROAMPHETAMINE SACCHARATE, AMPHETAMINE ASPARTATE, DEXTROAMPHETAMINE SULFATE AND AMPHETAMINE SULFATE 2.5; 2.5; 2.5; 2.5 MG/1; MG/1; MG/1; MG/1
10 TABLET ORAL 2 TIMES DAILY
Qty: 60 TABLET | Refills: 0 | Status: SHIPPED | OUTPATIENT
Start: 2017-08-02 | End: 2017-09-05

## 2017-08-02 NOTE — TELEPHONE ENCOUNTER
Adderall 10 mg Tablets      Last Written Prescription Date: 07/03/2017  Last Fill Quantity: 60,  # refills: 0   Last Office Visit with [facility] or [facility] prescribing provider: 03/01/2017    Thanks!    Alia Pranke, Float Technician   [place] [facility]

## 2017-08-14 ENCOUNTER — THERAPY VISIT (OUTPATIENT)
Dept: CHIROPRACTIC MEDICINE | Facility: CLINIC | Age: 27
End: 2017-08-14
Payer: COMMERCIAL

## 2017-08-14 DIAGNOSIS — M99.03 SOMATIC DYSFUNCTION OF LUMBAR REGION: Primary | ICD-10-CM

## 2017-08-14 DIAGNOSIS — M99.01 CERVICAL SEGMENT DYSFUNCTION: ICD-10-CM

## 2017-08-14 DIAGNOSIS — M54.50 ACUTE RIGHT-SIDED LOW BACK PAIN WITHOUT SCIATICA: ICD-10-CM

## 2017-08-14 DIAGNOSIS — M99.02 THORACIC SEGMENT DYSFUNCTION: ICD-10-CM

## 2017-08-14 PROCEDURE — 99203 OFFICE O/P NEW LOW 30 MIN: CPT | Mod: 25 | Performed by: CHIROPRACTOR

## 2017-08-14 PROCEDURE — 98941 CHIROPRACT MANJ 3-4 REGIONS: CPT | Mod: AT | Performed by: CHIROPRACTOR

## 2017-08-14 NOTE — PROGRESS NOTES
.g  Initial Chiropractic Clinic Visit    PCP: Sara Parsons    Wander Dinero is a 27 year old male who is seen  as a self referral presenting with acute lower back pain, upper back, and neck stiffness which started after sitting for prolonged time reading.  Patient reports that the onset was gradual over the last few weeks. When asked, patient denies:, falling, slipping, bending and reaching or sleeping awkwardly. Patient reports having achy stiffness in the neck and mid-back region.  Prior to onset, the patient was able to sit 2 hours without back pain. Patient notes that due to symptoms, they can only sit 1/2 hour. Wander Dinero notes   sitting rated at a 4/10 painful, difficult and prior to this incident it was 0/10.        Injury: No history of trauma or injury    Location of Pain: right sided lower lumbar/sacral pain, stiffness mid-back and lower neck at the following level(s) C6 , T4 , T5 , L5  and Sacrum   Duration of Pain: 1-2 weeks  Rating of Pain at worst: 5/10  Rating of Pain Currently: 4-5/10  Symptoms are better with: rest  Symptoms are worse with: prolonged sitting, driving  Additional Features: Denies LE pain or weakness     Health History  as reported by the patient:    How does the patient rate their own health:   Excellent    Current or past medical history:   No red flags identified    Medical allergies  None    Past Traumas/Surgeries  Orthopedic: ankle surgery June 2016    Family History  This patient has no significant family history    Medications:  none    Occupation:  Student    Primary job tasks:   Computer work and Prolonged sitting    Barriers as home/work:   none    Additional health Issues:     FANNY Viramontes was asked to complete the Oswestry Low Back Disability Index and Monserrat Start Back screening tool.  today in the office.  Disability score: 14%. Keel Start Total Score:1 Sub Score: 0     Review of Systems  Musculoskeletal: as above  Remainder of review of systems is negative  "including constitutional, CV, pulmonary, GI, Skin and Neurologic except as noted in HPI or medical history.    No past medical history on file.  Past Surgical History:   Procedure Laterality Date     ARTHROSCOPY ANKLE, REPAIR LIGAMENT Left 6/20/2016    Procedure: ARTHROSCOPY ANKLE, REPAIR LIGAMENT;  Surgeon: Umberto Das DPM;  Location: [facility] OR     ENT SURGERY  2009    wisdom teeth      Objective  There were no vitals taken for this visit.      GENERAL APPEARANCE: healthy, alert and no distress   GAIT: NORMAL  SKIN: no suspicious lesions or rashes  NEURO: Normal strength and tone, mentation intact and speech normal  PSYCH:  mentation appears normal and affect normal/bright    Low back exam:    Inspection:  \"     no visible deformity in the low back       normal skin\",    ROM:       full flexion-achy stiffness at end range       full extension    Tender:       paraspinal muscles    Non Tender:       remainder of lumbar spine    Strength:       ankle dorsiflexion 5/5       ankle plantarflexion 5/5       dorsiflexion of the great toe 5/5    Reflexes:       patellar (L3, L4) symmetric normal       achilles tendons (S1) symmetric normal    Sensation:      grossly intact throughout lower extremities    Special tests:  Kemps - Right positive, produced LBP and Left negative, SLR - Right negative and Left negative, Slump - Right negative and Left negative and Fabere - Right negative and Left negative    Segmental spinal dysfunction/restrictions found at::  C6 Right rotation restricted  T5 Left rotation restricted  L5 Right rotation restricted  Sacrum Right rotation restricted.    The following soft tissue hypotonicities were observed:Quad lumb: right, referred pain: no  Traps: ache, referred pain: no    Trigger points were found in:Lumbar erector spine, Quad lumb and Traps    Muscle spasm found in:Lumbar erector spine, Quad lumb, T-spine paraspinal and Traps      Radiology:  none    Assessment:    No diagnosis " found.    RX ordered/plan of care  Anticipated outcomes  Possible risks and side effects    After discussing the risk and benefits of care, patient consented to treatment    Prognosis: Good      Patient's condition:  Patient had restrictions pre-manipulation and Patient had decreased motion prior to manipulation    Treatment effectiveness:  Post manipulation there is better intersegmental movement and Patient claims to feel looser post manipulation      Plan:    Procedures:  Evaluation and Management:  72559 Moderate level exam 30 min    CMT:  12133 Chiropractic manipulative treatment 3-4 regions performed   Cervical: Diversified, C5 , C6, Supine  Thoracic: Diversified, T5, T6, Prone  Lumbar: Diversified, L4, L5, Side posture  Pelvis: Drop Table, PSIS Right , Prone    Modalities:  None performed this visit    Therapeutic procedures:  Exercises: Bridging, scapular retraction, pretzel stretch.     Response to Treatment  Patient tolerated the treatment well today.      Goals:  Sit comfortably for 2 hours  Drive comfortably for  2 hours  Duration to achieve goal will be 3 weeks at a frequency of 1x per week    Treatment plan  Evaluation  Spinal Chiropractic Manipulative Therapy:  C6, T5, L5, SIJ  1 X week, once daily  for 1-3 weeks      Recommendations:    Instructions:ice 20 minutes every other hour as needed    Follow-up:  Return to care in 1-2 weeks if symptoms persist.       Discussed the assessment with the patient.      Disclaimer: This note consists of symbols derived from keyboarding, dictation and/or voice recognition software. As a result, there may be errors in the script that have gone undetected. Please consider this when interpreting information found in this chart.

## 2017-09-05 DIAGNOSIS — F98.8 ATTENTION DEFICIT DISORDER, UNSPECIFIED HYPERACTIVITY PRESENCE: ICD-10-CM

## 2017-09-05 RX ORDER — DEXTROAMPHETAMINE SACCHARATE, AMPHETAMINE ASPARTATE, DEXTROAMPHETAMINE SULFATE AND AMPHETAMINE SULFATE 2.5; 2.5; 2.5; 2.5 MG/1; MG/1; MG/1; MG/1
10 TABLET ORAL 2 TIMES DAILY
Qty: 60 TABLET | Refills: 0 | Status: SHIPPED | OUTPATIENT
Start: 2017-09-05 | End: 2017-10-02

## 2017-09-05 NOTE — TELEPHONE ENCOUNTER
Adderall      Last Written Prescription Date:  8/2/17  Last Fill Quantity: 60,   # refills: 0  Last Office Visit with [facility] provider: 3/1/17  Future Office visit:       Routing refill request to provider for review/approval because:  Drug not on the [facility] refill protocol or controlled substance

## 2017-10-02 DIAGNOSIS — F98.8 ATTENTION DEFICIT DISORDER, UNSPECIFIED HYPERACTIVITY PRESENCE: ICD-10-CM

## 2017-10-02 NOTE — TELEPHONE ENCOUNTER
adderall      Last Written Prescription Date:  9/5/17  Last Fill Quantity: 60,   # refills: 0  Last Office Visit with [facility] provider: 3/1/17  Future Office visit:       Routing refill request to provider for review/approval because:  Drug not on the [facility] refill protocol or controlled substance

## 2017-10-03 RX ORDER — DEXTROAMPHETAMINE SACCHARATE, AMPHETAMINE ASPARTATE, DEXTROAMPHETAMINE SULFATE AND AMPHETAMINE SULFATE 2.5; 2.5; 2.5; 2.5 MG/1; MG/1; MG/1; MG/1
10 TABLET ORAL 2 TIMES DAILY
Qty: 60 TABLET | Refills: 0 | Status: SHIPPED | OUTPATIENT
Start: 2017-10-03 | End: 2017-11-06

## 2017-11-06 DIAGNOSIS — F98.8 ATTENTION DEFICIT DISORDER, UNSPECIFIED HYPERACTIVITY PRESENCE: ICD-10-CM

## 2017-11-06 RX ORDER — DEXTROAMPHETAMINE SACCHARATE, AMPHETAMINE ASPARTATE, DEXTROAMPHETAMINE SULFATE AND AMPHETAMINE SULFATE 2.5; 2.5; 2.5; 2.5 MG/1; MG/1; MG/1; MG/1
10 TABLET ORAL 2 TIMES DAILY
Qty: 60 TABLET | Refills: 0 | Status: SHIPPED | OUTPATIENT
Start: 2017-11-06 | End: 2018-01-09

## 2017-11-06 NOTE — TELEPHONE ENCOUNTER
adderall      Last Written Prescription Date:  10/3/17  Last Fill Quantity: 60,   # refills: 0  Future Office visit:     LOV: 3/1/17    Routing refill request to provider for review/approval because:  Drug not on the [facility] or [facility] refill protocol or controlled substance

## 2018-01-09 DIAGNOSIS — F98.8 ATTENTION DEFICIT DISORDER, UNSPECIFIED HYPERACTIVITY PRESENCE: ICD-10-CM

## 2018-01-09 RX ORDER — DEXTROAMPHETAMINE SACCHARATE, AMPHETAMINE ASPARTATE, DEXTROAMPHETAMINE SULFATE AND AMPHETAMINE SULFATE 2.5; 2.5; 2.5; 2.5 MG/1; MG/1; MG/1; MG/1
10 TABLET ORAL 2 TIMES DAILY
Qty: 60 TABLET | Refills: 0 | Status: SHIPPED | OUTPATIENT
Start: 2018-01-09 | End: 2018-02-07

## 2018-01-09 NOTE — TELEPHONE ENCOUNTER
Adderall      Last Written Prescription Date:  11/6/17  Last Fill Quantity: 60,   # refills: 0  Last Office Visit: 11/6/17  Future Office visit:       Routing refill request to provider for review/approval because:  Drug not on the [facility], [facility] or [facility] refill protocol or controlled substance

## 2018-02-07 DIAGNOSIS — F98.8 ATTENTION DEFICIT DISORDER, UNSPECIFIED HYPERACTIVITY PRESENCE: ICD-10-CM

## 2018-02-07 RX ORDER — DEXTROAMPHETAMINE SACCHARATE, AMPHETAMINE ASPARTATE, DEXTROAMPHETAMINE SULFATE AND AMPHETAMINE SULFATE 2.5; 2.5; 2.5; 2.5 MG/1; MG/1; MG/1; MG/1
10 TABLET ORAL 2 TIMES DAILY
Qty: 60 TABLET | Refills: 0 | Status: SHIPPED | OUTPATIENT
Start: 2018-02-07 | End: 2018-03-13

## 2018-02-07 NOTE — TELEPHONE ENCOUNTER
adderall      Last Written Prescription Date:  1/9/18  Last Fill Quantity: 60,   # refills: 0  Last Office Visit: 3/1/17  Future Office visit:       Routing refill request to provider for review/approval because:  Drug not on the [facility], [facility] or [facility] refill protocol or controlled substance

## 2018-03-12 DIAGNOSIS — F98.8 ATTENTION DEFICIT DISORDER, UNSPECIFIED HYPERACTIVITY PRESENCE: Primary | ICD-10-CM

## 2018-03-13 RX ORDER — DEXTROAMPHETAMINE SACCHARATE, AMPHETAMINE ASPARTATE, DEXTROAMPHETAMINE SULFATE AND AMPHETAMINE SULFATE 1.25; 1.25; 1.25; 1.25 MG/1; MG/1; MG/1; MG/1
10 TABLET ORAL 2 TIMES DAILY
Refills: 0 | Status: CANCELLED | OUTPATIENT
Start: 2018-03-13

## 2018-03-13 RX ORDER — DEXTROAMPHETAMINE SACCHARATE, AMPHETAMINE ASPARTATE, DEXTROAMPHETAMINE SULFATE AND AMPHETAMINE SULFATE 2.5; 2.5; 2.5; 2.5 MG/1; MG/1; MG/1; MG/1
10 TABLET ORAL 2 TIMES DAILY
Qty: 60 TABLET | Refills: 0 | Status: SHIPPED | OUTPATIENT
Start: 2018-03-13 | End: 2018-04-11

## 2018-04-11 DIAGNOSIS — F98.8 ATTENTION DEFICIT DISORDER, UNSPECIFIED HYPERACTIVITY PRESENCE: ICD-10-CM

## 2018-04-11 RX ORDER — DEXTROAMPHETAMINE SACCHARATE, AMPHETAMINE ASPARTATE, DEXTROAMPHETAMINE SULFATE AND AMPHETAMINE SULFATE 2.5; 2.5; 2.5; 2.5 MG/1; MG/1; MG/1; MG/1
10 TABLET ORAL 2 TIMES DAILY
Qty: 60 TABLET | Refills: 0 | Status: SHIPPED | OUTPATIENT
Start: 2018-04-11 | End: 2018-05-17

## 2018-05-17 DIAGNOSIS — F98.8 ATTENTION DEFICIT DISORDER, UNSPECIFIED HYPERACTIVITY PRESENCE: ICD-10-CM

## 2018-05-17 RX ORDER — DEXTROAMPHETAMINE SACCHARATE, AMPHETAMINE ASPARTATE, DEXTROAMPHETAMINE SULFATE AND AMPHETAMINE SULFATE 2.5; 2.5; 2.5; 2.5 MG/1; MG/1; MG/1; MG/1
10 TABLET ORAL 2 TIMES DAILY
Qty: 60 TABLET | Refills: 0 | Status: SHIPPED | OUTPATIENT
Start: 2018-05-17 | End: 2018-05-23

## 2018-05-17 NOTE — TELEPHONE ENCOUNTER
Take rx down to [facility] and contact patient.     Adderall 10 mg      Last Written Prescription Date:  4/11/18  Last Fill Quantity: 60,   # refills: 0  Last Office Visit: 3/1/17 but has appt next week with PCP  Future Office visit:    Next 5 appointments (look out 90 days)     May 23, 2018  7:40 AM CDT   SHORT with TUYET Welch CNP   [facility]    [address]  [facility] [id number] [phone]                   Routing refill request to provider for review/approval because:  Drug not on the [facility], [facility] or  Health refill protocol or controlled substance  Signed CSA on file  [place] reviewed:  reviewed- no concerns    [place]  [email or web address]

## 2018-05-23 ENCOUNTER — OFFICE VISIT (OUTPATIENT)
Dept: INTERNAL MEDICINE | Facility: CLINIC | Age: 28
End: 2018-05-23
Payer: COMMERCIAL

## 2018-05-23 VITALS
HEIGHT: 73 IN | BODY MASS INDEX: 23.19 KG/M2 | OXYGEN SATURATION: 100 % | SYSTOLIC BLOOD PRESSURE: 120 MMHG | HEART RATE: 65 BPM | RESPIRATION RATE: 18 BRPM | WEIGHT: 175 LBS | DIASTOLIC BLOOD PRESSURE: 72 MMHG | TEMPERATURE: 98.1 F

## 2018-05-23 DIAGNOSIS — E55.9 VITAMIN D DEFICIENCY: ICD-10-CM

## 2018-05-23 DIAGNOSIS — L70.0 ACNE VULGARIS: ICD-10-CM

## 2018-05-23 DIAGNOSIS — F98.8 ATTENTION DEFICIT DISORDER, UNSPECIFIED HYPERACTIVITY PRESENCE: Primary | ICD-10-CM

## 2018-05-23 LAB
ALBUMIN SERPL-MCNC: 4.4 G/DL (ref 3.4–5)
ALP SERPL-CCNC: 59 U/L (ref 40–150)
ALT SERPL W P-5'-P-CCNC: 30 U/L (ref 0–70)
ANION GAP SERPL CALCULATED.3IONS-SCNC: 7 MMOL/L (ref 3–14)
AST SERPL W P-5'-P-CCNC: 19 U/L (ref 0–45)
BASOPHILS # BLD AUTO: 0 10E9/L (ref 0–0.2)
BASOPHILS NFR BLD AUTO: 0.2 %
BILIRUB SERPL-MCNC: 0.5 MG/DL (ref 0.2–1.3)
BUN SERPL-MCNC: 15 MG/DL (ref 7–30)
CALCIUM SERPL-MCNC: 9.7 MG/DL (ref 8.5–10.1)
CHLORIDE SERPL-SCNC: 105 MMOL/L (ref 94–109)
CO2 SERPL-SCNC: 29 MMOL/L (ref 20–32)
CREAT SERPL-MCNC: 0.98 MG/DL (ref 0.66–1.25)
DEPRECATED CALCIDIOL+CALCIFEROL SERPL-MC: 31 UG/L (ref 20–75)
DIFFERENTIAL METHOD BLD: NORMAL
EOSINOPHIL # BLD AUTO: 0.2 10E9/L (ref 0–0.7)
EOSINOPHIL NFR BLD AUTO: 3.4 %
ERYTHROCYTE [DISTWIDTH] IN BLOOD BY AUTOMATED COUNT: 14.3 % (ref 10–15)
GFR SERPL CREATININE-BSD FRML MDRD: 90 ML/MIN/1.7M2
GLUCOSE SERPL-MCNC: 91 MG/DL (ref 70–99)
HCT VFR BLD AUTO: 44.4 % (ref 40–53)
HGB BLD-MCNC: 14.3 G/DL (ref 13.3–17.7)
LYMPHOCYTES # BLD AUTO: 1.9 10E9/L (ref 0.8–5.3)
LYMPHOCYTES NFR BLD AUTO: 39.9 %
MCH RBC QN AUTO: 26.5 PG (ref 26.5–33)
MCHC RBC AUTO-ENTMCNC: 32.2 G/DL (ref 31.5–36.5)
MCV RBC AUTO: 82 FL (ref 78–100)
MONOCYTES # BLD AUTO: 0.5 10E9/L (ref 0–1.3)
MONOCYTES NFR BLD AUTO: 9.7 %
NEUTROPHILS # BLD AUTO: 2.2 10E9/L (ref 1.6–8.3)
NEUTROPHILS NFR BLD AUTO: 46.8 %
PLATELET # BLD AUTO: 219 10E9/L (ref 150–450)
POTASSIUM SERPL-SCNC: 4.3 MMOL/L (ref 3.4–5.3)
PROT SERPL-MCNC: 7.6 G/DL (ref 6.8–8.8)
RBC # BLD AUTO: 5.4 10E12/L (ref 4.4–5.9)
SODIUM SERPL-SCNC: 141 MMOL/L (ref 133–144)
TSH SERPL DL<=0.005 MIU/L-ACNC: 1.26 MU/L (ref 0.4–4)
WBC # BLD AUTO: 4.7 10E9/L (ref 4–11)

## 2018-05-23 PROCEDURE — 99214 OFFICE O/P EST MOD 30 MIN: CPT | Performed by: NURSE PRACTITIONER

## 2018-05-23 PROCEDURE — 82306 VITAMIN D 25 HYDROXY: CPT | Performed by: NURSE PRACTITIONER

## 2018-05-23 PROCEDURE — 80053 COMPREHEN METABOLIC PANEL: CPT | Performed by: NURSE PRACTITIONER

## 2018-05-23 PROCEDURE — 85025 COMPLETE CBC W/AUTO DIFF WBC: CPT | Performed by: NURSE PRACTITIONER

## 2018-05-23 PROCEDURE — 36415 COLL VENOUS BLD VENIPUNCTURE: CPT | Performed by: NURSE PRACTITIONER

## 2018-05-23 PROCEDURE — 84443 ASSAY THYROID STIM HORMONE: CPT | Performed by: NURSE PRACTITIONER

## 2018-05-23 RX ORDER — DEXTROAMPHETAMINE SACCHARATE, AMPHETAMINE ASPARTATE, DEXTROAMPHETAMINE SULFATE AND AMPHETAMINE SULFATE 5; 5; 5; 5 MG/1; MG/1; MG/1; MG/1
20 TABLET ORAL 2 TIMES DAILY
Qty: 60 TABLET | Refills: 0 | Status: SHIPPED | OUTPATIENT
Start: 2018-05-23 | End: 2018-06-21

## 2018-05-23 RX ORDER — CLINDAMYCIN PHOSPHATE 10 UG/ML
LOTION TOPICAL 2 TIMES DAILY
Qty: 60 ML | Refills: 3 | Status: SHIPPED | OUTPATIENT
Start: 2018-05-23 | End: 2021-02-03

## 2018-05-23 RX ORDER — ADAPALENE 0.1 G/100G
CREAM TOPICAL AT BEDTIME
Qty: 45 G | Refills: 11 | Status: SHIPPED | OUTPATIENT
Start: 2018-05-23 | End: 2021-02-03

## 2018-05-23 NOTE — MR AVS SNAPSHOT
"              After Visit Summary   5/23/2018    Wander Dinero    MRN: [id number]           Patient Information     Date Of Birth          1990        Visit Information        Provider Department      5/23/2018 7:40 AM Sara Parsons APRN CNP [facility]        Today's Diagnoses     Attention deficit disorder, unspecified hyperactivity presence    -  1    Acne vulgaris        Vitamin D deficiency          Care Instructions    Lab in [address]    Increase adderall 20 mg twice daily     Consider restart of vitamin D           Follow-ups after your visit        Who to contact     If you have questions or need follow up information about today's clinic visit or your schedule please contact [facility] directly at [phone].  Normal or non-critical lab and imaging results will be communicated to you by MyChart, letter or phone within 4 business days after the clinic has received the results. If you do not hear from us within 7 days, please contact the clinic through MyChart or phone. If you have a critical or abnormal lab result, we will notify you by phone as soon as possible.  Submit refill requests through [employer] or call your pharmacy and they will forward the refill request to us. Please allow 3 business days for your refill to be completed.          Additional Information About Your Visit        MyChart Information     [employer] lets you send messages to your doctor, view your test results, renew your prescriptions, schedule appointments and more. To sign up, go to [email or web address] . Click on \"Log in\" on the left side of the screen, which will take you to the Welcome page. Then click on \"Sign up Now\" on the right side of the page.     You will be asked to enter the access code listed below, as well as some personal information. Please follow the directions to create your username and password.     Your access code is: [email or web address]  Expires: 8/21/2018  8:24 AM   " "  Your access code will  in 90 days. If you need help or a new code, please call your [facility] clinic or [phone].        Care EveryWhere ID     This is your Care EveryWhere ID. This could be used by other organizations to access your [facility] medical records  [id number]        Your Vitals Were     Pulse Temperature Respirations Height Pulse Oximetry BMI (Body Mass Index)    65 98.1  F (36.7  C) (Oral) 18 6' 1\" (1.854 m) 100% 23.09 kg/m2       Blood Pressure from Last 3 Encounters:   18 120/72   05/10/17 130/66   17 116/60    Weight from Last 3 Encounters:   18 175 lb (79.4 kg)   05/10/17 180 lb (81.6 kg)   17 182 lb (82.6 kg)              We Performed the Following     CBC with platelets differential     Comprehensive metabolic panel     TSH with free T4 reflex     Vitamin D Deficiency          Today's Medication Changes          These changes are accurate as of 18  8:24 AM.  If you have any questions, ask your nurse or doctor.               Start taking these medicines.        Dose/Directions    amphetamine-dextroamphetamine 20 MG per tablet   Commonly known as:  ADDERALL   Used for:  Attention deficit disorder, unspecified hyperactivity presence   Replaces:  amphetamine-dextroamphetamine 10 MG per tablet   Started by:  Sara Parsons APRN CNP        Dose:  20 mg   Take 1 tablet (20 mg) by mouth 2 times daily   Quantity:  60 tablet   Refills:  0         Stop taking these medicines if you haven't already. Please contact your care team if you have questions.     amphetamine-dextroamphetamine 10 MG per tablet   Commonly known as:  ADDERALL   Replaced by:  amphetamine-dextroamphetamine 20 MG per tablet   Stopped by:  Sara Parsons APRN CNP                Where to get your medicines      These medications were sent to [facility] [place], [place] - [facility] [address]  [address] [facility] [address]     Phone:  [phone]     " adapalene 0.1 % cream    clindamycin 1 % lotion         Some of these will need a paper prescription and others can be bought over the counter.  Ask your nurse if you have questions.     Bring a paper prescription for each of these medications     amphetamine-dextroamphetamine 20 MG per tablet                Primary Care Provider Office Phone # Fax #    TUYET Welch [phone]       [address] [facility] [address]        Equal Access to Services     STAR CLARKE : Hadii aad ku hadasho Soomaali, waaxda luqadaha, qaybta kaalmada adeegyada, waxay idiin hayaan adeeg kharash la'charisse . [facility] [phone].    ATENCIÓN: Si habla español, tiene a castillo disposición servicios gratuitos de asistencia lingüística. [facility] [phone].    We comply with applicable federal civil rights laws and [place] laws. We do not discriminate on the basis of race, color, national origin, age, disability, sex, sexual orientation, or gender identity.            Thank you!     Thank you for choosing [facility]  for your care. Our goal is always to provide you with excellent care. Hearing back from our patients is one way we can continue to improve our services. Please take a few minutes to complete the written survey that you may receive in the mail after your visit with us. Thank you!             Your Updated Medication List - Protect others around you: Learn how to safely use, store and throw away your medicines at www.disposemymeds.org.          This list is accurate as of 5/23/18  8:24 AM.  Always use your most recent med list.                   Brand Name Dispense Instructions for use Diagnosis    adapalene 0.1 % cream    DIFFERIN    45 g    Apply topically At Bedtime    Acne vulgaris       amphetamine-dextroamphetamine 20 MG per tablet    ADDERALL    60 tablet    Take 1 tablet (20 mg) by mouth 2 times daily    Attention deficit disorder, unspecified hyperactivity presence        clindamycin 1 % lotion    CLEOCIN T    60 mL    Apply topically 2 times daily    Acne vulgaris       vitamin D 2000 units tablet     100 tablet    Take 2,000 Units by mouth daily

## 2018-05-23 NOTE — PROGRESS NOTES
".  SUBJECTIVE:   Wander Dinero is a 28 year old male who presents to clinic today for the following health issues:    Med check  Wants to increase med if possible.    Would like to increase to 20 mg twice daily      Using acne medication intermittently     Getting  on Saturday               Problem list and histories reviewed & adjusted, as indicated.  Additional history: as documented    Patient Active Problem List   Diagnosis     Foot fracture, left, sequela     ADD (attention deficit disorder)     Controlled substance agreement signed     Past Surgical History:   Procedure Laterality Date     ARTHROSCOPY ANKLE, REPAIR LIGAMENT Left 6/20/2016    Procedure: ARTHROSCOPY ANKLE, REPAIR LIGAMENT;  Surgeon: Umberto Das DPM;  Location: [facility] OR     ENT SURGERY  2009    wisdom teeth        Social History   Substance Use Topics     Smoking status: Never Smoker     Smokeless tobacco: Never Used     Alcohol use 0.0 oz/week     0 Standard drinks or equivalent per week      Comment: 2 per week     Family History   Problem Relation Age of Onset     Other - See Comments Mother      healthy      Other - See Comments Father      healthy      DIABETES No family hx of      Coronary Artery Disease No family hx of      Hypertension No family hx of      Breast Cancer No family hx of      Colon Cancer No family hx of      Prostate Cancer No family hx of      Other Cancer No family hx of            Reviewed and updated as needed this visit by clinical staff  Tobacco  Allergies  Meds  Med Hx  Surg Hx  Fam Hx  Soc Hx      Reviewed and updated as needed this visit by Provider  Allergies         ROS:  Constitutional, HEENT, cardiovascular, pulmonary, gi and gu systems are negative, except as otherwise noted.    OBJECTIVE:     /72 (BP Location: Left arm, Patient Position: Sitting, Cuff Size: Adult Large)  Pulse 65  Temp 98.1  F (36.7  C) (Oral)  Resp 18  Ht 6' 1\" (1.854 m)  Wt 175 lb (79.4 kg)  SpO2 100%  BMI " 23.09 kg/m2  Body mass index is 23.09 kg/(m^2).  GENERAL: alert and no distress  RESP: lungs clear to auscultation - no rales, rhonchi or wheezes  CV: regular rate and rhythm  ABDOMEN: soft, nontender,  and bowel sounds normal  MS: no gross musculoskeletal defects noted, no edema  NEURO: Normal strength and tone, mentation intact and speech normal  PSYCH: mentation appears normal, affect normal/bright    Diagnostic Test Results:  Labs    ASSESSMENT/PLAN:             1. Attention deficit disorder, unspecified hyperactivity presence  Needs increase in medication   - amphetamine-dextroamphetamine (ADDERALL) 20 MG per tablet; Take 1 tablet (20 mg) by mouth 2 times daily  Dispense: 60 tablet; Refill: 0  - CBC with platelets differential  - Comprehensive metabolic panel  - TSH with free T4 reflex    2. Acne vulgaris  Using prn   - adapalene (DIFFERIN) 0.1 % cream; Apply topically At Bedtime  Dispense: 45 g; Refill: 11  - clindamycin (CLEOCIN T) 1 % lotion; Apply topically 2 times daily  Dispense: 60 mL; Refill: 3  - Comprehensive metabolic panel    3. Vitamin D deficiency    - Vitamin D Deficiency    Patient Instructions   Lab in [address]    Increase adderall 20 mg twice daily     Consider restart of vitamin D       TUYET Welch [facility]

## 2018-05-23 NOTE — NURSING NOTE
"Chief Complaint   Patient presents with     Recheck Medication     initial /72 (BP Location: Left arm, Patient Position: Sitting, Cuff Size: Adult Large)  Pulse 65  Temp 98.1  F (36.7  C) (Oral)  Resp 18  Ht 6' 1\" (1.854 m)  Wt 175 lb (79.4 kg)  SpO2 100%  BMI 23.09 kg/m2 Estimated body mass index is 23.09 kg/(m^2) as calculated from the following:    Height as of this encounter: 6' 1\" (1.854 m).    Weight as of this encounter: 175 lb (79.4 kg)..  bp completed using cuff size large  DORENE ANSARI LPN  "

## 2018-06-21 DIAGNOSIS — F98.8 ATTENTION DEFICIT DISORDER, UNSPECIFIED HYPERACTIVITY PRESENCE: ICD-10-CM

## 2018-06-21 RX ORDER — DEXTROAMPHETAMINE SACCHARATE, AMPHETAMINE ASPARTATE, DEXTROAMPHETAMINE SULFATE AND AMPHETAMINE SULFATE 5; 5; 5; 5 MG/1; MG/1; MG/1; MG/1
20 TABLET ORAL 2 TIMES DAILY
Qty: 60 TABLET | Refills: 0 | Status: SHIPPED | OUTPATIENT
Start: 2018-06-21 | End: 2018-07-20

## 2018-06-21 NOTE — TELEPHONE ENCOUNTER
Pt calling for refill on Adderall.  [facility].    Adderall      Last Written Prescription Date:  5-23-18  Last Fill Quantity: 60,   # refills: 0  Last Office Visit: 5-23-18  Future Office visit:       Routing refill request to provider for review/approval because:  Drug not on the [facility], [facility] or  Health refill protocol or controlled substance    Signed CSA form in chart.    [place] reviewed:  reviewed- no concerns    [place]:  [email or web address]    Please advise, thanks.

## 2018-07-20 DIAGNOSIS — F98.8 ATTENTION DEFICIT DISORDER, UNSPECIFIED HYPERACTIVITY PRESENCE: ICD-10-CM

## 2018-07-20 RX ORDER — DEXTROAMPHETAMINE SACCHARATE, AMPHETAMINE ASPARTATE, DEXTROAMPHETAMINE SULFATE AND AMPHETAMINE SULFATE 5; 5; 5; 5 MG/1; MG/1; MG/1; MG/1
20 TABLET ORAL 2 TIMES DAILY
Qty: 60 TABLET | Refills: 0 | Status: SHIPPED | OUTPATIENT
Start: 2018-07-20 | End: 2018-08-20

## 2018-07-20 NOTE — TELEPHONE ENCOUNTER
Please bring written Rx to [facility].     Review of [place] Prescription Monitoring Program (): No concern for abuse or misuse of controlled medications based on this report.

## 2018-07-20 NOTE — TELEPHONE ENCOUNTER
Patient calling for refill on Adderall.  Hand carry rx to  pharmacy.    Adderall      Last Written Prescription Date:  6-21-18  Last Fill Quantity: 60,   # refills: 0  Last Office Visit: 5-23-18  Future Office visit:       Routing refill request to provider for review/approval because:  Drug not on the [facility], [facility] or [facility] refill protocol or controlled substance    Signed CSA form in chart.    [place] reviewed:  reviewed- no concerns    [place]:  [email or web address]    Please advise, thanks.    This request is forwarded to Radha Meneses NP who covers Dr Bernard and Dr Mireles patients with the letters F,H,I,J

## 2018-08-20 DIAGNOSIS — F98.8 ATTENTION DEFICIT DISORDER, UNSPECIFIED HYPERACTIVITY PRESENCE: ICD-10-CM

## 2018-08-20 NOTE — TELEPHONE ENCOUNTER
Adderall      Last Written Prescription Date:  07/20/18  Last Fill Quantity: 60,   # refills: 0  Last Office Visit: 05/23/18  Sara  Future Office visit:       Routing refill request to provider for review/approval because:  Drug not on the [facility], [facility] or [facility] refill protocol or controlled substance

## 2018-08-21 RX ORDER — DEXTROAMPHETAMINE SACCHARATE, AMPHETAMINE ASPARTATE, DEXTROAMPHETAMINE SULFATE AND AMPHETAMINE SULFATE 5; 5; 5; 5 MG/1; MG/1; MG/1; MG/1
20 TABLET ORAL 2 TIMES DAILY
Qty: 60 TABLET | Refills: 0 | Status: SHIPPED | OUTPATIENT
Start: 2018-08-21 | End: 2018-09-20

## 2018-09-20 DIAGNOSIS — F98.8 ATTENTION DEFICIT DISORDER, UNSPECIFIED HYPERACTIVITY PRESENCE: ICD-10-CM

## 2018-09-20 RX ORDER — DEXTROAMPHETAMINE SACCHARATE, AMPHETAMINE ASPARTATE, DEXTROAMPHETAMINE SULFATE AND AMPHETAMINE SULFATE 5; 5; 5; 5 MG/1; MG/1; MG/1; MG/1
20 TABLET ORAL 2 TIMES DAILY
Qty: 60 TABLET | Refills: 0 | Status: SHIPPED | OUTPATIENT
Start: 2018-09-20 | End: 2018-10-22

## 2018-09-20 NOTE — TELEPHONE ENCOUNTER
ADDERALL      Last Written Prescription Date:  08/21/18  Last Fill Quantity: 60,   # refills: 0  Last Office Visit: 05/23/18  Future Office visit:       Routing refill request to provider for review/approval because:  Drug not on the [facility], [facility] or [facility] refill protocol or controlled substance

## 2018-10-22 DIAGNOSIS — F98.8 ATTENTION DEFICIT DISORDER, UNSPECIFIED HYPERACTIVITY PRESENCE: ICD-10-CM

## 2018-10-22 RX ORDER — DEXTROAMPHETAMINE SACCHARATE, AMPHETAMINE ASPARTATE, DEXTROAMPHETAMINE SULFATE AND AMPHETAMINE SULFATE 5; 5; 5; 5 MG/1; MG/1; MG/1; MG/1
20 TABLET ORAL 2 TIMES DAILY
Qty: 60 TABLET | Refills: 0 | Status: SHIPPED | OUTPATIENT
Start: 2018-10-22 | End: 2018-11-25

## 2018-10-22 NOTE — TELEPHONE ENCOUNTER
Adderall refill request. TAKE to [facility] pharmacy. He was advised of this.         Last Written Prescription Date:  9/20/18  Last Fill Quantity: 60,   # refills: 0    Last Office Visit: 5/23/18    Future Office visit:       Routing refill request to provider for review/approval because:  Drug not on the [facility], [facility] or  Health refill protocol or controlled substance    Confirmed [place] , dispensed on 9/21/18 for #60. No concerns.

## 2018-11-25 DIAGNOSIS — F98.8 ATTENTION DEFICIT DISORDER, UNSPECIFIED HYPERACTIVITY PRESENCE: ICD-10-CM

## 2018-11-25 NOTE — TELEPHONE ENCOUNTER
Wander Nunez is requesting his monthly adderall prescription for November. If this is appropriate please have rx printed and signed and one of our staff members will  the hard copy on one of our daily runs.    Thank you,  Lucila Campos & [facility] Staff Technician   [facility]  [email or web address]   Ph#: [phone]   Fax#: [phone]

## 2018-11-26 RX ORDER — DEXTROAMPHETAMINE SACCHARATE, AMPHETAMINE ASPARTATE, DEXTROAMPHETAMINE SULFATE AND AMPHETAMINE SULFATE 5; 5; 5; 5 MG/1; MG/1; MG/1; MG/1
20 TABLET ORAL 2 TIMES DAILY
Qty: 60 TABLET | Refills: 0 | Status: SHIPPED | OUTPATIENT
Start: 2018-11-26 | End: 2018-12-18 | Stop reason: DRUGHIGH

## 2018-11-26 NOTE — TELEPHONE ENCOUNTER
Requested Prescriptions   Pending Prescriptions Disp Refills     amphetamine-dextroamphetamine (ADDERALL) 20 MG tablet  Last Written Prescription Date:  10/22/18  Last Fill Quantity: 30,  # refills: 0   Last office visit: 5/23/2018 with prescribing provider:  Jaqueline Jarvis Office Visit:     60 tablet 0     Sig: Take 1 tablet (20 mg) by mouth 2 times daily    There is no refill protocol information for this order

## 2018-12-18 ENCOUNTER — OFFICE VISIT (OUTPATIENT)
Dept: INTERNAL MEDICINE | Facility: CLINIC | Age: 28
End: 2018-12-18
Payer: COMMERCIAL

## 2018-12-18 VITALS
DIASTOLIC BLOOD PRESSURE: 60 MMHG | RESPIRATION RATE: 12 BRPM | TEMPERATURE: 98.8 F | HEIGHT: 73 IN | WEIGHT: 175 LBS | HEART RATE: 60 BPM | SYSTOLIC BLOOD PRESSURE: 102 MMHG | OXYGEN SATURATION: 98 % | BODY MASS INDEX: 23.19 KG/M2

## 2018-12-18 DIAGNOSIS — Z79.899 CONTROLLED SUBSTANCE AGREEMENT SIGNED: ICD-10-CM

## 2018-12-18 DIAGNOSIS — F98.8 ATTENTION DEFICIT DISORDER, UNSPECIFIED HYPERACTIVITY PRESENCE: Primary | ICD-10-CM

## 2018-12-18 DIAGNOSIS — R05.9 COUGH: ICD-10-CM

## 2018-12-18 DIAGNOSIS — J06.9 UPPER RESPIRATORY TRACT INFECTION, UNSPECIFIED TYPE: ICD-10-CM

## 2018-12-18 PROCEDURE — 99213 OFFICE O/P EST LOW 20 MIN: CPT | Performed by: NURSE PRACTITIONER

## 2018-12-18 PROCEDURE — 99000 SPECIMEN HANDLING OFFICE-LAB: CPT | Performed by: NURSE PRACTITIONER

## 2018-12-18 PROCEDURE — 80307 DRUG TEST PRSMV CHEM ANLYZR: CPT | Mod: 90 | Performed by: NURSE PRACTITIONER

## 2018-12-18 RX ORDER — DEXTROAMPHETAMINE SACCHARATE, AMPHETAMINE ASPARTATE, DEXTROAMPHETAMINE SULFATE AND AMPHETAMINE SULFATE 7.5; 7.5; 7.5; 7.5 MG/1; MG/1; MG/1; MG/1
30 TABLET ORAL 2 TIMES DAILY
Qty: 60 TABLET | Refills: 0 | Status: SHIPPED | OUTPATIENT
Start: 2018-12-18 | End: 2019-01-18

## 2018-12-18 ASSESSMENT — MIFFLIN-ST. JEOR: SCORE: 1812.67

## 2018-12-18 NOTE — PROGRESS NOTES
SUBJECTIVE:   Wander Dinero is a 28 year old male who presents to clinic today for the following health issues:    Cough x one week. Worse at night.   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  Additional history: as documented    Patient Active Problem List   Diagnosis     Foot fracture, left, sequela     ADD (attention deficit disorder)     Controlled substance agreement signed     Past Surgical History:   Procedure Laterality Date     ARTHROSCOPY ANKLE, REPAIR LIGAMENT Left 6/20/2016    Procedure: ARTHROSCOPY ANKLE, REPAIR LIGAMENT;  Surgeon: Umberto Das DPM;  Location: [facility] OR     ENT SURGERY  2009    wisdom teeth        Social History     Tobacco Use     Smoking status: Never Smoker     Smokeless tobacco: Never Used   Substance Use Topics     Alcohol use: Yes     Alcohol/week: 0.0 oz     Comment: 2 per week     Family History   Problem Relation Age of Onset     Other - See Comments Mother         healthy      Other - See Comments Father         healthy      Diabetes No family hx of      Coronary Artery Disease No family hx of      Hypertension No family hx of      Breast Cancer No family hx of      Colon Cancer No family hx of      Prostate Cancer No family hx of      Other Cancer No family hx of            Reviewed and updated as needed this visit by clinical staff  Tobacco  Allergies  Meds  Soc Hx      Reviewed and updated as needed this visit  "by Provider  Allergies         ROS:  Constitutional, HEENT, cardiovascular, pulmonary, gi and gu systems are negative, except as otherwise noted.    OBJECTIVE:     /60   Pulse 60   Temp 98.8  F (37.1  C) (Oral)   Resp 12   Ht 1.854 m (6' 1\")   Wt 79.4 kg (175 lb)   SpO2 98%   BMI 23.09 kg/m    Body mass index is 23.09 kg/m .  GENERAL: alert and no distress  HENT: ear canals and TM's normal, nose and mouth without ulcers or lesions  RESP: lungs clear to auscultation - no rales, rhonchi or wheezes  CV: regular rate and rhythm  PSYCH: mentation appears normal, affect normal/bright    Diagnostic Test Results:  none     ASSESSMENT/PLAN:         1. Attention deficit disorder, unspecified hyperactivity presence  Increase dose   - amphetamine-dextroamphetamine (ADDERALL) 30 MG tablet; Take 1 tablet (30 mg) by mouth 2 times daily  Dispense: 60 tablet; Refill: 0  - Drug  Screen Comprehensive , Urine with Reported Meds (MedTox) (Pain Care Package)    2. Controlled substance agreement signed    - amphetamine-dextroamphetamine (ADDERALL) 30 MG tablet; Take 1 tablet (30 mg) by mouth 2 times daily  Dispense: 60 tablet; Refill: 0  - Drug  Screen Comprehensive , Urine with Reported Meds (MedTox) (Pain Care Package)    3. Upper respiratory tract infection, unspecified type  Viral UPPER RESPIRATORY INFECTION getting better     4. Cough  Discussed OTC cough syrup Delsym prn       Patient Instructions   Adderall 30 mg twice daily     Lab in [address]          [facility]    "

## 2018-12-18 NOTE — LETTER
[facility]  12/18/18    Patient: Wander Dinero  YOB: 1990  Medical Record Number: [id number]  CSN: [id number]                                                                              Non-opioid Controlled Substance Agreement    I understand that my care provider has prescribed a controlled substance to help manage my condition(s). I am taking this medicine to help me function or work. I know this is strong medicine, and that it can cause serious side effects. Controlled substances can be sedating, addicting and may cause a dependency on the drug. They can affect my ability to drive or think, and cause depression. They need to be taken exactly as prescribed. Combining controlled substances with certain medicines or chemicals (such as cocaine, sedatives and tranquilizers, sleeping pills, meth) can be dangerous or even fatal. Also, if I stop controlled substances suddenly, I may have severe withdrawal symptoms.  If not helpful, I may be asked to stop them.    The risks, benefits, and side effects of these medicine(s) were explained to me. I agree that:    1. I will take part in other treatments as advised by my care team. This may be psychiatry or counseling, physical therapy, behavioral therapy, group treatment or a referral to a pain clinic. I will reduce or stop my medicine when my care team tells me to do so.  2. I will take my medicines as prescribed. I will not change the dose or schedule unless my care team tells me to. There will be no refills if I  run out early.   I may be contactedwithout warning and asked to complete a urine drug test or pill count at any time.   3. I will keep all my appointments, and understand this is part of the monitoring of controlled substances. My care team may require an office visit for EVERY controlled substance refill. If I miss appointments or don t follow instructions, my care team may stop my medicine.  4. I will not ask other providers to  prescribe controlled substances, and I will not accept controlled substances from other people. If I need another prescribed controlled substance for a new reason, I will tell my care team within 1 business day.  5. I will use one pharmacy to fill all of my controlled substance prescriptions, and it is up to me to make sure that I do not run out of my medicines on weekends or holidays. If my care team is willing to refill my controlled substance prescription without a visit, I must request refills only during office hours, refills may take up to 3 days to process, and it may take up to 5 to 7 days for my medicine to be mailed and ready at my pharmacy. Prescriptions will not be mailed anywhere except my pharmacy.    6. I am responsible for my prescriptions. If the medicine/prescription is lost or stolen, it will not be replaced. I also agree not to share controlled substance medicines with anyone.              [facility]  12/18/18  Patient:  Wander Dinero  YOB: 1990  Medical Record Number: [id number]  CSN: [id number]    7. I agree to not use ANY illegal or recreational drugs. This includes marijuana, cocaine, bath salts or other drugs. I agree not to use alcohol unless my care team says I may. I agree to give urine samples whenever asked. If I don t give a urine sample, the care team may stop my medicine.    8. If I enroll in the [place] Medical Marijuana program, I will tell my care team. I will also sign an agreement to share my medical records with my care team.    9. I will bring in my list of medicines (or my medicine bottles) each time I come to the clinic.   10. I will tell my care team right away if I become pregnant or have a new medical problem treated outside of my regular clinic.  11. I understand that this medicine can affect my thinking and judgment. It may be unsafe for me to drive, use machinery and do dangerous tasks. I will not do any of these things until I know how the  medicine affects me. If my dose changes, I will wait to see how it affects me. I will contact my care team if I have concerns about medicine side effects.    I understand that if I do not follow any of the conditions above, my prescriptions or treatment may be stopped.      I agree that my provider, clinic care team, and pharmacy may work with any city, state or federal law enforcement agency that investigates the misuse, sale, or other diversion of my controlled medicine. I will allow my provider to discuss my care with or share a copy of this agreement with any other treating provider, pharmacy or emergency room where I receive care. I agree to give up (waive) any right of privacy or confidentiality with respect to these consents.   I have read this agreement and have asked questions about anything I did not understand.    ____________________________________________________    ____________  ________  Patient signature                                                         Date      Time    ____________________________________________________     ____________  ________  Witness                                                          Date      Time    ____________________________________________________  Provider signature

## 2018-12-22 LAB — PAIN DRUG SCR UR W RPTD MEDS: NORMAL

## 2018-12-27 DIAGNOSIS — F98.8 ATTENTION DEFICIT DISORDER, UNSPECIFIED HYPERACTIVITY PRESENCE: Primary | ICD-10-CM

## 2018-12-27 DIAGNOSIS — Z79.899 CONTROLLED SUBSTANCE AGREEMENT SIGNED: ICD-10-CM

## 2018-12-28 DIAGNOSIS — Z79.899 CONTROLLED SUBSTANCE AGREEMENT SIGNED: ICD-10-CM

## 2018-12-28 DIAGNOSIS — F98.8 ATTENTION DEFICIT DISORDER, UNSPECIFIED HYPERACTIVITY PRESENCE: ICD-10-CM

## 2018-12-28 PROCEDURE — 80307 DRUG TEST PRSMV CHEM ANLYZR: CPT | Mod: 90 | Performed by: NURSE PRACTITIONER

## 2018-12-28 PROCEDURE — 99000 SPECIMEN HANDLING OFFICE-LAB: CPT | Performed by: NURSE PRACTITIONER

## 2019-01-18 DIAGNOSIS — Z79.899 CONTROLLED SUBSTANCE AGREEMENT SIGNED: ICD-10-CM

## 2019-01-18 DIAGNOSIS — F98.8 ATTENTION DEFICIT DISORDER, UNSPECIFIED HYPERACTIVITY PRESENCE: ICD-10-CM

## 2019-01-18 RX ORDER — DEXTROAMPHETAMINE SACCHARATE, AMPHETAMINE ASPARTATE, DEXTROAMPHETAMINE SULFATE AND AMPHETAMINE SULFATE 7.5; 7.5; 7.5; 7.5 MG/1; MG/1; MG/1; MG/1
30 TABLET ORAL 2 TIMES DAILY
Qty: 60 TABLET | Refills: 0 | Status: SHIPPED | OUTPATIENT
Start: 2019-01-18 | End: 2019-02-20

## 2019-01-18 NOTE — TELEPHONE ENCOUNTER
Please place prescription in the [facility] pharmacy folder.    Routing refill request to provider for review/approval because:  Drug not on the [facility] refill protocol     Signed CSA form in chart.    [place] reviewed:  reviewed- no concerns    [place]  [email or web address]

## 2019-01-18 NOTE — TELEPHONE ENCOUNTER
Prescription authorized per primary care provider and placed in [facility] folder.

## 2019-01-18 NOTE — TELEPHONE ENCOUNTER
Adderall (unable to order med)      Last Written Prescription Date:  12/18/18  Last Fill Quantity: 60,   # refills: 0  Last Office Visit: 12/18/18  Future Office visit: none      Routing refill request to provider for review/approval because:  Drug not on the [facility], [facility] or [facility] refill protocol or controlled substance

## 2019-02-19 DIAGNOSIS — F98.8 ATTENTION DEFICIT DISORDER, UNSPECIFIED HYPERACTIVITY PRESENCE: ICD-10-CM

## 2019-02-19 DIAGNOSIS — Z79.899 CONTROLLED SUBSTANCE AGREEMENT SIGNED: ICD-10-CM

## 2019-02-20 RX ORDER — DEXTROAMPHETAMINE SACCHARATE, AMPHETAMINE ASPARTATE, DEXTROAMPHETAMINE SULFATE AND AMPHETAMINE SULFATE 7.5; 7.5; 7.5; 7.5 MG/1; MG/1; MG/1; MG/1
30 TABLET ORAL 2 TIMES DAILY
Qty: 60 TABLET | Refills: 0 | Status: SHIPPED | OUTPATIENT
Start: 2019-02-20 | End: 2019-03-19

## 2019-02-20 NOTE — TELEPHONE ENCOUNTER
Pending Prescriptions:                       Disp   Refills    amphetamine-dextroamphetamine (ADDERALL) *60 tab*0            Sig: Take 1 tablet (30 mg) by mouth 2 times daily          Last Written Prescription Date:  01/18/19  Last Fill Quantity: 60,   # refills: 0  Last Office Visit: 12/18/18  Future Office visit:       Routing refill request to provider for review/approval because:  Drug not on the [facility], [facility] or [facility] refill protocol or controlled substance

## 2019-03-18 DIAGNOSIS — Z79.899 CONTROLLED SUBSTANCE AGREEMENT SIGNED: ICD-10-CM

## 2019-03-18 DIAGNOSIS — F98.8 ATTENTION DEFICIT DISORDER, UNSPECIFIED HYPERACTIVITY PRESENCE: ICD-10-CM

## 2019-03-18 NOTE — TELEPHONE ENCOUNTER
Controlled Substance Refill Request for adderall  Problem List Complete:    No     PROVIDER TO CONSIDER COMPLETION OF PROBLEM LIST AND OVERVIEW/CONTROLLED SUBSTANCE AGREEMENT    Last Written Prescription Date:  2/20/18  Last Fill Quantity: 60,   # refills: 0      Last Office Visit with [facility] primary care provider: 12/18/18    Future Office visit:     Controlled substance agreement:   Encounter-Level CSA - 12/02/2016:    Controlled Substance Agreement - Scan on 12/7/2016  7:49 AM: CONTROLLED SUBSTANCE AGREEMENT (below)       Patient-Level CSA:    Controlled Substance Agreement - Opioid - Scan on 12/18/2018  5:30 PM: NON-OPIOID CONTROLLED SUBSTANCE AGREEMENT (below)           Last Urine Drug Screen:   Pain Drug SCR UR W RPTD Meds   Date Value Ref Range Status   12/18/2018 FINAL  Final     Comment:     (Note)  ====================================================================  [facility] COMP DRUG ANALYSIS,UR  ====================================================================  Test                             Result       Flag       Units        Drug Present   Amphetamine                    3761                    ng/mg creat    Amphetamine is available as a schedule II prescription drug.   Gabapentin                     PRESENT                               Acetaminophen                  PRESENT                               Dextromethorphan               PRESENT                               Dextrorphan/Levorphanol        PRESENT                                Dextrorphan is an expected metabolite of dextromethorphan, an    over-the-counter or prescription cough suppressant. Dextrorphan    cannot be distinguished from the scheduled prescription    medication levorphanol by the method used for analysis.   Guaifenesin                    PRESENT                                Guaifenesin may be administered as an over-the-counter or    prescription drug; it may also be present as a breakdown product    of  methocarbamol.  ====================================================================  Test                      Result    Flag   Units      Ref Range        Creatinine              72               mg/dL      >=20            ====================================================================  Declared Medications:  Medication list was not provided.  ====================================================================  For clinical consultation, please call [phone].  ====================================================================  Analysis performed by [facility] [employer] [place] [address]     ,   Comprehen Drug Analysis UR   Date Value Ref Range Status   12/28/2018 FINAL  Final     Comment:     (Note)  ====================================================================  COMPREHENSIVE DRUG ANALYSIS,UR  ====================================================================  Test                             Result       Flag       Units        Drug Present   Amphetamine                    >6944                   ng/mg creat    Amphetamine is available as a schedule II prescription drug.  ====================================================================  Test                      Result    Flag   Units      Ref Range        Creatinine              144              mg/dL      >=20            ====================================================================  For clinical consultation, please call [phone].  ====================================================================  Analysis performed by [facility] [place] [address]     , No results found for: THC13, PCP13, COC13, MAMP13, OPI13, AMP13, BZO13, TCA13, MTD13, BAR13, OXY13, PPX13, BUP13     Processing:  Staff will hand deliver Rx to on-site pharmacy     [email or web address]       checked in past 3 months?  No, route to RN

## 2019-03-19 RX ORDER — DEXTROAMPHETAMINE SACCHARATE, AMPHETAMINE ASPARTATE, DEXTROAMPHETAMINE SULFATE AND AMPHETAMINE SULFATE 7.5; 7.5; 7.5; 7.5 MG/1; MG/1; MG/1; MG/1
30 TABLET ORAL 2 TIMES DAILY
Qty: 60 TABLET | Refills: 0 | Status: SHIPPED | OUTPATIENT
Start: 2019-03-19 | End: 2019-04-23

## 2019-04-10 ENCOUNTER — THERAPY VISIT (OUTPATIENT)
Dept: CHIROPRACTIC MEDICINE | Facility: CLINIC | Age: 29
End: 2019-04-10
Payer: COMMERCIAL

## 2019-04-10 DIAGNOSIS — M54.2 CERVICALGIA: ICD-10-CM

## 2019-04-10 DIAGNOSIS — G89.29 CHRONIC BILATERAL LOW BACK PAIN WITHOUT SCIATICA: ICD-10-CM

## 2019-04-10 DIAGNOSIS — M99.02 THORACIC SEGMENT DYSFUNCTION: ICD-10-CM

## 2019-04-10 DIAGNOSIS — M99.07 SOMATIC DYSFUNCTION OF UPPER EXTREMITIES: ICD-10-CM

## 2019-04-10 DIAGNOSIS — M99.03 SOMATIC DYSFUNCTION OF LUMBAR REGION: Primary | ICD-10-CM

## 2019-04-10 DIAGNOSIS — M54.50 CHRONIC BILATERAL LOW BACK PAIN WITHOUT SCIATICA: ICD-10-CM

## 2019-04-10 DIAGNOSIS — M25.512 LEFT SHOULDER PAIN: ICD-10-CM

## 2019-04-10 PROCEDURE — 99213 OFFICE O/P EST LOW 20 MIN: CPT | Mod: 25 | Performed by: CHIROPRACTOR

## 2019-04-10 PROCEDURE — 97110 THERAPEUTIC EXERCISES: CPT | Performed by: CHIROPRACTOR

## 2019-04-10 PROCEDURE — 98943 CHIROPRACT MANJ XTRSPINL 1/>: CPT | Mod: 51 | Performed by: CHIROPRACTOR

## 2019-04-10 PROCEDURE — 98941 CHIROPRACT MANJ 3-4 REGIONS: CPT | Mod: AT | Performed by: CHIROPRACTOR

## 2019-04-10 NOTE — PROGRESS NOTES
.g  Initial Chiropractic Clinic Visit    PCP: Sara Parsons    Wander Dinero is a 28 year old male who is seen  as a self referral presenting with lower back pain, upper back, and neck stiffness, left shoulder pain/stiffness which started after sitting for prolonged time computer work and studying.  Patient reports that the onset was gradual over the last few weeks. When asked, patient denies:, falling, slipping, bending and reaching or sleeping awkwardly. Patient reports having achy stiffness in the neck and mid-back region.  Prior to onset, the patient was able to sit 2 hours without back pain. Patient notes that due to symptoms, they can only sit 1/2 hour. Wander Dinero notes   sitting rated at a 4/10 painful, difficult and prior to this incident it was 0/10.        Injury: No history of trauma or injury    Location of Pain: right sided lower lumbar/sacral pain, stiffness mid-back and lower neck at the following level(s) C6 , T4 , T5 , L5  and Sacrum   Duration of Pain: 1 month  Rating of Pain at worst: 6/10  Rating of Pain Currently: 4-5/10  Symptoms are better with: rest  Symptoms are worse with: prolonged sitting, driving  Additional Features: Denies LE pain or weakness     Health History  as reported by the patient:    How does the patient rate their own health:   Excellent    Current or past medical history:   No red flags identified    Medical allergies  None    Past Traumas/Surgeries  Orthopedic: ankle surgery June 2016    Family History  This patient has no significant family history    Medications:  none    Occupation:  Student    Primary job tasks:   Computer work and Prolonged sitting    Barriers as home/work:   none    Additional health Issues:     FANNY Viramontes was asked to complete the Oswestry Low Back Disability Index and Monserrat Start Back screening tool.  today in the office.  Disability score: 14%. Keel Start Total Score:1 Sub Score: 0     Review of Systems  Musculoskeletal: as  "above  Remainder of review of systems is negative including constitutional, CV, pulmonary, GI, Skin and Neurologic except as noted in HPI or medical history.    Past Medical History:   Diagnosis Date     Controlled substance agreement signed 12/18/2018     resigned       Past Surgical History:   Procedure Laterality Date     ARTHROSCOPY ANKLE, REPAIR LIGAMENT Left 6/20/2016    Procedure: ARTHROSCOPY ANKLE, REPAIR LIGAMENT;  Surgeon: Umberto Das DPM;  Location: [facility] OR     ENT SURGERY  2009    wisdom teeth      Objective  There were no vitals taken for this visit.      GENERAL APPEARANCE: healthy, alert and no distress   GAIT: NORMAL  SKIN: no suspicious lesions or rashes  NEURO: Normal strength and tone, mentation intact and speech normal  PSYCH:  mentation appears normal and affect normal/bright      Cervical exam:  CROM: active-slight reduction and mild pain in flexion and right and left rotation, all other motions full and pain free    SHOULDER ROM:  Left shoulder-mild pain and slight reduction in external rotation and abuction    ORTHO: cervical compression: Neg, foraminal compression left and right-Neg    Hypertonic Muscles: pectoralis, scalenes, upper traps    NEURO: Neurological testing of the upper extremities reveals bilateral upper extremity DTR's to be +2, equal and reactive. Bilateral upper extremity strength was found be equal and graded.+5/5. In addition, upper extremity sensation was found to be equal as tested by pinwheel and light touch.        Low back exam:    Inspection:  \"     no visible deformity in the low back       normal skin\",    ROM:       full flexion-achy stiffness at end range       full extension    Tender:       paraspinal muscles    Non Tender:       remainder of lumbar spine    Strength:       ankle dorsiflexion 5/5       ankle plantarflexion 5/5       dorsiflexion of the great toe 5/5    Reflexes:       patellar (L3, L4) symmetric normal       achilles tendons (S1) " symmetric normal    Sensation:      grossly intact throughout lower extremities    Special tests:  Kemps - Right positive, produced LBP and Left negative, SLR - Right negative and Left negative, Slump - Right negative and Left negative and Fabere - Right negative and Left negative    Segmental spinal dysfunction/restrictions found at::  C6 Right rotation restricted  T5 Left rotation restricted  L5 Right rotation restricted  Sacrum Right rotation restricted.    The following soft tissue hypotonicities were observed:Quad lumb: right, referred pain: no  Traps: ache, referred pain: no    Trigger points were found in:Lumbar erector spine, Quad lumb and Traps    Muscle spasm found in:Lumbar erector spine, Quad lumb, T-spine paraspinal and Traps      Radiology:  none    Assessment:    No diagnosis found.    RX ordered/plan of care  Anticipated outcomes  Possible risks and side effects    After discussing the risk and benefits of care, patient consented to treatment    Prognosis: Good      Patient's condition:  Patient had restrictions pre-manipulation and Patient had decreased motion prior to manipulation    Treatment effectiveness:  Post manipulation there is better intersegmental movement and Patient claims to feel looser post manipulation      Plan:    Procedures:  Evaluation and Management:  16150 Moderate level exam 30 min    CMT:  42043 Chiropractic manipulative treatment 3-4 regions performed   Cervical: Diversified, C5 , C6, Supine  Thoracic: Diversified, T5, T6, Prone  Lumbar: Diversified, L4, L5, Side posture  Pelvis: Drop Table, PSIS Right , Prone    Modalities:  None performed this visit    Therapeutic procedures:  Therapeutic exercises: wall stretch, pectoralis stretch, hamstring stretch. Spent 10 minutes on this activity. [email or web address] was given.    Response to Treatment  Patient tolerated the treatment well today.      Goals:  Sit comfortably for 2 hours  Drive comfortably for  2 hours  Duration to achieve goal  will be 3 weeks at a frequency of 1x per week    Treatment plan  Evaluation  Spinal Chiropractic Manipulative Therapy:  C6, T5, L5, SIJ  1 X week, once daily  for 1-3 weeks      Recommendations:    Instructions:ice 20 minutes every other hour as needed    Follow-up:  Return to care in 1-2 weeks if symptoms persist.       Discussed the assessment with the patient.      Disclaimer: This note consists of symbols derived from keyboarding, dictation and/or voice recognition software. As a result, there may be errors in the script that have gone undetected. Please consider this when interpreting information found in this chart.

## 2019-04-23 DIAGNOSIS — F98.8 ATTENTION DEFICIT DISORDER, UNSPECIFIED HYPERACTIVITY PRESENCE: ICD-10-CM

## 2019-04-23 DIAGNOSIS — Z79.899 CONTROLLED SUBSTANCE AGREEMENT SIGNED: ICD-10-CM

## 2019-04-23 RX ORDER — DEXTROAMPHETAMINE SACCHARATE, AMPHETAMINE ASPARTATE, DEXTROAMPHETAMINE SULFATE AND AMPHETAMINE SULFATE 7.5; 7.5; 7.5; 7.5 MG/1; MG/1; MG/1; MG/1
30 TABLET ORAL 2 TIMES DAILY
Qty: 60 TABLET | Refills: 0 | Status: SHIPPED | OUTPATIENT
Start: 2019-04-23 | End: 2019-05-22

## 2019-04-23 NOTE — TELEPHONE ENCOUNTER
Controlled Substance Refill Request for adderall  Problem List Complete:    No     PROVIDER TO CONSIDER COMPLETION OF PROBLEM LIST AND OVERVIEW/CONTROLLED SUBSTANCE AGREEMENT    Last Written Prescription Date:  3/19/19  Last Fill Quantity: 60,   # refills: 0    Last Office Visit with [facility] [place] care provider: 12/18/18 Jaqueline    Future Office visit:     Controlled substance agreement:   Encounter-Level CSA - 12/02/2016:    Controlled Substance Agreement - Scan on 12/7/2016  7:49 AM: CONTROLLED SUBSTANCE AGREEMENT (below)       Patient-Level CSA:    Controlled Substance Agreement - Opioid - Scan on 12/18/2018  5:30 PM: NON-OPIOID CONTROLLED SUBSTANCE AGREEMENT (below)           Last Urine Drug Screen:   Pain Drug SCR UR W RPTD Meds   Date Value Ref Range Status   12/18/2018 FINAL  Final     Comment:     (Note)  ====================================================================  [facility] COMP DRUG ANALYSIS,UR  ====================================================================  Test                             Result       Flag       Units        Drug Present   Amphetamine                    3761                    ng/mg creat    Amphetamine is available as a schedule II prescription drug.   Gabapentin                     PRESENT                               Acetaminophen                  PRESENT                               Dextromethorphan               PRESENT                               Dextrorphan/Levorphanol        PRESENT                                Dextrorphan is an expected metabolite of dextromethorphan, an    over-the-counter or prescription cough suppressant. Dextrorphan    cannot be distinguished from the scheduled prescription    medication levorphanol by the method used for analysis.   Guaifenesin                    PRESENT                                Guaifenesin may be administered as an over-the-counter or    prescription drug; it may also be present as a breakdown product    of  methocarbamol.  ====================================================================  Test                      Result    Flag   Units      Ref Range        Creatinine              72               mg/dL      >=20            ====================================================================  Declared Medications:  Medication list was not provided.  ====================================================================  For clinical consultation, please call [phone].  ====================================================================  Analysis performed by [employer] [place] [address]     ,   Comprehen Drug Analysis UR   Date Value Ref Range Status   12/28/2018 FINAL  Final     Comment:     (Note)  ====================================================================  COMPREHENSIVE DRUG ANALYSIS,UR  ====================================================================  Test                             Result       Flag       Units        Drug Present   Amphetamine                    >6944                   ng/mg creat    Amphetamine is available as a schedule II prescription drug.  ====================================================================  Test                      Result    Flag   Units      Ref Range        Creatinine              144              mg/dL      >=20            ====================================================================  For clinical consultation, please call [phone].  ====================================================================  Analysis performed by [employer] [place] [address]     , No results found for: THC13, PCP13, COC13, MAMP13, OPI13, AMP13, BZO13, TCA13, MTD13, BAR13, OXY13, PPX13, BUP13     Processing:  Staff will hand deliver Rx to on-site pharmacy     [email or web address]       checked in past 3 months?  No, route to RN

## 2019-05-22 DIAGNOSIS — Z79.899 CONTROLLED SUBSTANCE AGREEMENT SIGNED: ICD-10-CM

## 2019-05-22 DIAGNOSIS — F98.8 ATTENTION DEFICIT DISORDER, UNSPECIFIED HYPERACTIVITY PRESENCE: ICD-10-CM

## 2019-05-22 RX ORDER — DEXTROAMPHETAMINE SACCHARATE, AMPHETAMINE ASPARTATE, DEXTROAMPHETAMINE SULFATE AND AMPHETAMINE SULFATE 7.5; 7.5; 7.5; 7.5 MG/1; MG/1; MG/1; MG/1
30 TABLET ORAL 2 TIMES DAILY
Qty: 60 TABLET | Refills: 0 | Status: SHIPPED | OUTPATIENT
Start: 2019-05-22 | End: 2019-06-23

## 2019-05-22 NOTE — TELEPHONE ENCOUNTER
Controlled Substance Refill Request for amphetamine-dextroamphetamine (ADDERALL) 30 MG tablet  Problem List Complete:    Yes    Last Written Prescription Date:  4/23/19  Last Fill Quantity: 60,   # refills: 0  Last Office Visit with [facility] [place] provider: 12/18/18    Future Office visit:     Controlled substance agreement:   Encounter-Level CSA - 12/02/2016:    Controlled Substance Agreement - Scan on 12/7/2016  7:49 AM: CONTROLLED SUBSTANCE AGREEMENT (below)       Patient-Level CSA:    Controlled Substance Agreement - Opioid - Scan on 12/18/2018  5:30 PM: NON-OPIOID CONTROLLED SUBSTANCE AGREEMENT (below)           Last Urine Drug Screen:   Pain Drug SCR UR W RPTD Meds   Date Value Ref Range Status   12/18/2018 FINAL  Final     Comment:     (Note)  ====================================================================  TOXASSURE COMP DRUG ANALYSIS,UR  ====================================================================  Test                             Result       Flag       Units        Drug Present   Amphetamine                    3761                    ng/mg creat    Amphetamine is available as a schedule II prescription drug.   Gabapentin                     PRESENT                               Acetaminophen                  PRESENT                               Dextromethorphan               PRESENT                               Dextrorphan/Levorphanol        PRESENT                                Dextrorphan is an expected metabolite of dextromethorphan, an    over-the-counter or prescription cough suppressant. Dextrorphan    cannot be distinguished from the scheduled prescription    medication levorphanol by the method used for analysis.   Guaifenesin                    PRESENT                                Guaifenesin may be administered as an over-the-counter or    prescription drug; it may also be present as a breakdown product    of  methocarbamol.  ====================================================================  Test                      Result    Flag   Units      Ref Range        Creatinine              72               mg/dL      >=20            ====================================================================  Declared Medications:  Medication list was not provided.  ====================================================================  For clinical consultation, please call [phone].  ====================================================================  Analysis performed by [employer] [place] [address]     ,   Comprehen Drug Analysis UR   Date Value Ref Range Status   12/28/2018 FINAL  Final     Comment:     (Note)  ====================================================================  COMPREHENSIVE DRUG ANALYSIS,UR  ====================================================================  Test                             Result       Flag       Units        Drug Present   Amphetamine                    >6944                   ng/mg creat    Amphetamine is available as a schedule II prescription drug.  ====================================================================  Test                      Result    Flag   Units      Ref Range        Creatinine              144              mg/dL      >=20            ====================================================================  For clinical consultation, please call [phone].  ====================================================================  Analysis performed by [employer] [place] [address]     , No results found for: THC13, PCP13, COC13, MAMP13, OPI13, AMP13, BZO13, TCA13, MTD13, BAR13, OXY13, PPX13, BUP13     Processing:  Staff will hand deliver Rx to on-site pharmacy    [email or web address]    [place] reviewed:  reviewed- no concerns

## 2019-06-19 DIAGNOSIS — F98.8 ATTENTION DEFICIT DISORDER, UNSPECIFIED HYPERACTIVITY PRESENCE: ICD-10-CM

## 2019-06-19 DIAGNOSIS — Z79.899 CONTROLLED SUBSTANCE AGREEMENT SIGNED: ICD-10-CM

## 2019-06-19 NOTE — TELEPHONE ENCOUNTER
ADDERALL      Last Written Prescription Date:  05/22/19  Last Fill Quantity: 60,   # refills: 0  Last Office Visit: 12/18/18  Future Office visit:       Routing refill request to provider for review/approval because:  Drug not on the [facility], [facility] or [facility] refill protocol or controlled substance

## 2019-06-23 RX ORDER — DEXTROAMPHETAMINE SACCHARATE, AMPHETAMINE ASPARTATE, DEXTROAMPHETAMINE SULFATE AND AMPHETAMINE SULFATE 7.5; 7.5; 7.5; 7.5 MG/1; MG/1; MG/1; MG/1
30 TABLET ORAL 2 TIMES DAILY
Qty: 60 TABLET | Refills: 0 | Status: SHIPPED | OUTPATIENT
Start: 2019-06-23 | End: 2019-07-22

## 2019-07-22 DIAGNOSIS — F98.8 ATTENTION DEFICIT DISORDER, UNSPECIFIED HYPERACTIVITY PRESENCE: ICD-10-CM

## 2019-07-22 DIAGNOSIS — Z79.899 CONTROLLED SUBSTANCE AGREEMENT SIGNED: ICD-10-CM

## 2019-07-22 NOTE — TELEPHONE ENCOUNTER
Controlled Substance Refill Request for Adderall 30mg  Problem List Complete:    No     PROVIDER TO CONSIDER COMPLETION OF PROBLEM LIST AND OVERVIEW/CONTROLLED SUBSTANCE AGREEMENT    Last Written Prescription Date:  6-23-19  Last Fill Quantity: 60,   # refills: 0    THE MOST RECENT OFFICE VISIT MUST BE WITHIN THE PAST 3 MONTHS. AT LEAST ONE FACE TO FACE VISIT MUST OCCUR EVERY 6 MONTHS. ADDITIONAL VISITS CAN BE VIRTUAL.  (THIS STATEMENT SHOULD BE DELETED.)    Last Office Visit with [facility] [place] primary care provider: 12-18-18    Future Office visit:     Controlled substance agreement:   Encounter-Level CSA - 12/02/2016:    Controlled Substance Agreement - Scan on 12/7/2016  7:49 AM: CONTROLLED SUBSTANCE AGREEMENT (below)       Patient-Level CSA:    Controlled Substance Agreement - Opioid - Scan on 12/18/2018  5:30 PM: NON-OPIOID CONTROLLED SUBSTANCE AGREEMENT (below)           Last Urine Drug Screen:   Pain Drug SCR UR W RPTD Meds   Date Value Ref Range Status   12/18/2018 FINAL  Final     Comment:     (Note)  ====================================================================  TOXASSURE COMP DRUG ANALYSIS,UR  ====================================================================  Test                             Result       Flag       Units        Drug Present   Amphetamine                    3761                    ng/mg creat    Amphetamine is available as a schedule II prescription drug.   Gabapentin                     PRESENT                               Acetaminophen                  PRESENT                               Dextromethorphan               PRESENT                               Dextrorphan/Levorphanol        PRESENT                                Dextrorphan is an expected metabolite of dextromethorphan, an    over-the-counter or prescription cough suppressant. Dextrorphan    cannot be distinguished from the scheduled prescription    medication levorphanol by the method used for analysis.   Guaifenesin                     PRESENT                                Guaifenesin may be administered as an over-the-counter or    prescription drug; it may also be present as a breakdown product    of methocarbamol.  ====================================================================  Test                      Result    Flag   Units      Ref Range        Creatinine              72               mg/dL      >=20            ====================================================================  Declared Medications:  Medication list was not provided.  ====================================================================  For clinical consultation, please call [phone].  ====================================================================  Analysis performed by [employer], [address] [place] [address]     ,   [facility] Drug Analysis UR   Date Value Ref Range Status   12/28/2018 FINAL  Final     Comment:     (Note)  ====================================================================  COMPREHENSIVE DRUG ANALYSIS,UR  ====================================================================  Test                             Result       Flag       Units        Drug Present   Amphetamine                    >6944                   ng/mg creat    Amphetamine is available as a schedule II prescription drug.  ====================================================================  Test                      Result    Flag   Units      Ref Range        Creatinine              144              mg/dL      >=20            ====================================================================  For clinical consultation, please call [phone].  ====================================================================  Analysis performed by [employer] [place] [address]     , No results found for: THC13, PCP13, COC13, MAMP13, OPI13, AMP13, BZO13, TCA13, MTD13, BAR13, OXY13, PPX13, BUP13     Processing:  Staff will hand deliver Rx to  on-site pharmacy     [email or web address]       checked in past 3 months?  Yes 7-22-19  No concerns. Last filled 6-23-19 #60 tabs.    Please advise, thanks.

## 2019-07-22 NOTE — TELEPHONE ENCOUNTER
Requested Prescriptions   Pending Prescriptions Disp Refills     amphetamine-dextroamphetamine (ADDERALL) 30 MG tablet [Pharmacy Med Name: AMPHETAMINE SALTS 30MG TAB] 60 tablet 0     Sig: TAKE ONE TABLET BY MOUTH TWICE A DAY       There is no refill protocol information for this order      Last Written Prescription Date:  06/23/2019  Last Fill Quantity: 60,  # refills: 0   Last office visit: 12/18/2018 with prescribing provider:     Future Office Visit:

## 2019-07-23 RX ORDER — DEXTROAMPHETAMINE SACCHARATE, AMPHETAMINE ASPARTATE, DEXTROAMPHETAMINE SULFATE AND AMPHETAMINE SULFATE 7.5; 7.5; 7.5; 7.5 MG/1; MG/1; MG/1; MG/1
TABLET ORAL
Qty: 60 TABLET | Refills: 0 | Status: SHIPPED | OUTPATIENT
Start: 2019-07-23 | End: 2019-09-04

## 2019-08-22 DIAGNOSIS — F98.8 ATTENTION DEFICIT DISORDER, UNSPECIFIED HYPERACTIVITY PRESENCE: ICD-10-CM

## 2019-08-22 DIAGNOSIS — Z79.899 CONTROLLED SUBSTANCE AGREEMENT SIGNED: ICD-10-CM

## 2019-08-22 NOTE — TELEPHONE ENCOUNTER
Requested Prescriptions   Pending Prescriptions Disp Refills     amphetamine-dextroamphetamine (ADDERALL) 30 MG tablet [Pharmacy Med Name: AMPHETAMINE SALTS 30MG TAB] 60 tablet 0     Sig: TAKE ONE TABLET BY MOUTH TWICE A DAY       There is no refill protocol information for this order      Last Written Prescription Date:  07/23/2019  Last Fill Quantity: 60,  # refills: 0   Last office visit: 12/18/2018 with prescribing provider:     Future Office Visit:   Next 5 appointments (look out 90 days)    Sep 04, 2019 10:00 AM CDT  Office Visit with TUYET Welch CNP  [facility] [address]  [facility] [id number] [phone]

## 2019-08-23 RX ORDER — DEXTROAMPHETAMINE SACCHARATE, AMPHETAMINE ASPARTATE, DEXTROAMPHETAMINE SULFATE AND AMPHETAMINE SULFATE 7.5; 7.5; 7.5; 7.5 MG/1; MG/1; MG/1; MG/1
TABLET ORAL
Qty: 60 TABLET | Refills: 0 | Status: SHIPPED | OUTPATIENT
Start: 2019-08-23 | End: 2020-08-11

## 2019-08-23 NOTE — TELEPHONE ENCOUNTER
Controlled Substance Refill Request for Adderall  Problem List Complete:    No     PROVIDER TO CONSIDER COMPLETION OF PROBLEM LIST AND OVERVIEW/CONTROLLED SUBSTANCE AGREEMENT    Last Written Prescription Date:  7/23/19  Last Fill Quantity: 60,   # refills: 0    THE MOST RECENT OFFICE VISIT MUST BE WITHIN THE PAST 3 MONTHS. AT LEAST ONE FACE TO FACE VISIT MUST OCCUR EVERY 6 MONTHS. ADDITIONAL VISITS CAN BE VIRTUAL.  (THIS STATEMENT SHOULD BE DELETED.)    Last Office Visit with [facility] [place] primary care provider: 12/18/18    Future Office visit:   Next 5 appointments (look out 90 days)    Sep 04, 2019 10:00 AM CDT  Office Visit with TUYET Welch CNP  [facility] [address]  [facility] [address]  [phone]          Controlled substance agreement:   Encounter-Level CSA - 12/02/2016:    Controlled Substance Agreement - Scan on 12/7/2016  7:49 AM: CONTROLLED SUBSTANCE AGREEMENT (below)       Patient-Level CSA:    Controlled Substance Agreement - Opioid - Scan on 12/18/2018  5:30 PM: NON-OPIOID CONTROLLED SUBSTANCE AGREEMENT (below)           Last Urine Drug Screen:   Pain Drug SCR UR W RPTD Meds   Date Value Ref Range Status   12/18/2018 FINAL  Final     Comment:     (Note)  ====================================================================  TOXASSURE COMP DRUG ANALYSIS,UR  ====================================================================  Test                             Result       Flag       Units        Drug Present   Amphetamine                    3761                    ng/mg creat    Amphetamine is available as a schedule II prescription drug.   Gabapentin                     PRESENT                               Acetaminophen                  PRESENT                               Dextromethorphan               PRESENT                               Dextrorphan/Levorphanol        PRESENT                                Dextrorphan is an  expected metabolite of dextromethorphan, an    over-the-counter or prescription cough suppressant. Dextrorphan    cannot be distinguished from the scheduled prescription    medication levorphanol by the method used for analysis.   Guaifenesin                    PRESENT                                Guaifenesin may be administered as an over-the-counter or    prescription drug; it may also be present as a breakdown product    of methocarbamol.  ====================================================================  Test                      Result    Flag   Units      Ref Range        Creatinine              72               mg/dL      >=20            ====================================================================  Declared Medications:  Medication list was not provided.  ====================================================================  For clinical consultation, please call [phone].  ====================================================================  Analysis performed by [employer], [address] [place] [address]     ,   [facility] Drug Analysis UR   Date Value Ref Range Status   12/28/2018 FINAL  Final     Comment:     (Note)  ====================================================================  COMPREHENSIVE DRUG ANALYSIS,UR  ====================================================================  Test                             Result       Flag       Units        Drug Present   Amphetamine                    >6944                   ng/mg creat    Amphetamine is available as a schedule II prescription drug.  ====================================================================  Test                      Result    Flag   Units      Ref Range        Creatinine              144              mg/dL      >=20            ====================================================================  For clinical consultation, please call [phone].  ====================================================================  Analysis performed by [employer] [place] [address]     , No results found for: THC13, PCP13, COC13, MAMP13, OPI13, AMP13, BZO13, TCA13, MTD13, BAR13, OXY13, PPX13, BUP13     Processing:  Staff will hand deliver Rx to on-site pharmacy     [place] ([place] reviewed:  not reviewed/not due - last done on 7/22/19  [place] profile:  [email or web address]

## 2019-09-04 ENCOUNTER — OFFICE VISIT (OUTPATIENT)
Dept: BEHAVIORAL HEALTH | Facility: CLINIC | Age: 29
End: 2019-09-04
Payer: COMMERCIAL

## 2019-09-04 ENCOUNTER — OFFICE VISIT (OUTPATIENT)
Dept: INTERNAL MEDICINE | Facility: CLINIC | Age: 29
End: 2019-09-04
Payer: COMMERCIAL

## 2019-09-04 VITALS
BODY MASS INDEX: 23.86 KG/M2 | OXYGEN SATURATION: 97 % | DIASTOLIC BLOOD PRESSURE: 60 MMHG | HEART RATE: 80 BPM | HEIGHT: 73 IN | RESPIRATION RATE: 12 BRPM | SYSTOLIC BLOOD PRESSURE: 104 MMHG | WEIGHT: 180 LBS | TEMPERATURE: 98.2 F

## 2019-09-04 DIAGNOSIS — F98.8 ATTENTION DEFICIT DISORDER, UNSPECIFIED HYPERACTIVITY PRESENCE: ICD-10-CM

## 2019-09-04 DIAGNOSIS — F41.9 ANXIETY: ICD-10-CM

## 2019-09-04 DIAGNOSIS — F33.1 MODERATE EPISODE OF RECURRENT MAJOR DEPRESSIVE DISORDER (H): Primary | ICD-10-CM

## 2019-09-04 DIAGNOSIS — F33.1 MODERATE EPISODE OF RECURRENT MAJOR DEPRESSIVE DISORDER (H): ICD-10-CM

## 2019-09-04 DIAGNOSIS — F41.1 GAD (GENERALIZED ANXIETY DISORDER): Primary | ICD-10-CM

## 2019-09-04 LAB
BASOPHILS # BLD AUTO: 0 10E9/L (ref 0–0.2)
BASOPHILS NFR BLD AUTO: 0.5 %
DIFFERENTIAL METHOD BLD: NORMAL
EOSINOPHIL # BLD AUTO: 0.3 10E9/L (ref 0–0.7)
EOSINOPHIL NFR BLD AUTO: 5.3 %
ERYTHROCYTE [DISTWIDTH] IN BLOOD BY AUTOMATED COUNT: 14 % (ref 10–15)
HCT VFR BLD AUTO: 45.6 % (ref 40–53)
HGB BLD-MCNC: 14.7 G/DL (ref 13.3–17.7)
LYMPHOCYTES # BLD AUTO: 2 10E9/L (ref 0.8–5.3)
LYMPHOCYTES NFR BLD AUTO: 31.8 %
MCH RBC QN AUTO: 26.6 PG (ref 26.5–33)
MCHC RBC AUTO-ENTMCNC: 32.2 G/DL (ref 31.5–36.5)
MCV RBC AUTO: 83 FL (ref 78–100)
MONOCYTES # BLD AUTO: 0.5 10E9/L (ref 0–1.3)
MONOCYTES NFR BLD AUTO: 8 %
NEUTROPHILS # BLD AUTO: 3.5 10E9/L (ref 1.6–8.3)
NEUTROPHILS NFR BLD AUTO: 54.4 %
PLATELET # BLD AUTO: 236 10E9/L (ref 150–450)
RBC # BLD AUTO: 5.53 10E12/L (ref 4.4–5.9)
WBC # BLD AUTO: 6.4 10E9/L (ref 4–11)

## 2019-09-04 PROCEDURE — 85025 COMPLETE CBC W/AUTO DIFF WBC: CPT | Performed by: NURSE PRACTITIONER

## 2019-09-04 PROCEDURE — 99214 OFFICE O/P EST MOD 30 MIN: CPT | Performed by: NURSE PRACTITIONER

## 2019-09-04 PROCEDURE — 36415 COLL VENOUS BLD VENIPUNCTURE: CPT | Performed by: NURSE PRACTITIONER

## 2019-09-04 PROCEDURE — 84443 ASSAY THYROID STIM HORMONE: CPT | Performed by: NURSE PRACTITIONER

## 2019-09-04 PROCEDURE — 82306 VITAMIN D 25 HYDROXY: CPT | Performed by: NURSE PRACTITIONER

## 2019-09-04 PROCEDURE — 80053 COMPREHEN METABOLIC PANEL: CPT | Performed by: NURSE PRACTITIONER

## 2019-09-04 ASSESSMENT — ANXIETY QUESTIONNAIRES
6. BECOMING EASILY ANNOYED OR IRRITABLE: MORE THAN HALF THE DAYS
7. FEELING AFRAID AS IF SOMETHING AWFUL MIGHT HAPPEN: MORE THAN HALF THE DAYS
1. FEELING NERVOUS, ANXIOUS, OR ON EDGE: MORE THAN HALF THE DAYS
3. WORRYING TOO MUCH ABOUT DIFFERENT THINGS: MORE THAN HALF THE DAYS
5. BEING SO RESTLESS THAT IT IS HARD TO SIT STILL: MORE THAN HALF THE DAYS
IF YOU CHECKED OFF ANY PROBLEMS ON THIS QUESTIONNAIRE, HOW DIFFICULT HAVE THESE PROBLEMS MADE IT FOR YOU TO DO YOUR WORK, TAKE CARE OF THINGS AT HOME, OR GET ALONG WITH OTHER PEOPLE: SOMEWHAT DIFFICULT
2. NOT BEING ABLE TO STOP OR CONTROL WORRYING: SEVERAL DAYS
GAD7 TOTAL SCORE: 12

## 2019-09-04 ASSESSMENT — MIFFLIN-ST. JEOR: SCORE: 1830.35

## 2019-09-04 ASSESSMENT — PATIENT HEALTH QUESTIONNAIRE - PHQ9
SUM OF ALL RESPONSES TO PHQ QUESTIONS 1-9: 18
5. POOR APPETITE OR OVEREATING: SEVERAL DAYS

## 2019-09-04 NOTE — PROGRESS NOTES
Patient had appointment with his/her primary care physician. [facility] services were requested / offered. No immediate safety/risk issues were reported or identified.  Explained the role of the [facility] and provided informational handout and contact information for the [facility]. Denies plans or intentions. Came in today to get therapy and medication started before things got worse. Card was given to pt.    ADA King, Behavioral Health Clinician

## 2019-09-04 NOTE — PATIENT INSTRUCTIONS
Lab in [address]    Evie Goode counselor     Sertraline 50 mg daily   (Zoloft)    Follow up in a month

## 2019-09-04 NOTE — PROGRESS NOTES
"Subjective     Wander Dinero is a 29 year old male who presents to clinic today for the following health issues:    HPI     Anxiety   Depression     Moved to first home  Daughter started    Wife finished master   He finishes bachelors in April   Bought new car      Took a medication about 6 years ago for depression anxiety but not sure what it was     He also admitted himself to a facility for mental health once in past when he was feeling suicidal     He denies any suicidal intent or plan and would get help if this occurs  He contracts for safety and will go to ER or call for help if he gets to point of intent, or active plan to hurt himself or others      Has not been taking vitamin D consistently    Takes a MVI 2-3 times a week     He feels adderall has been helpful - does not feel this has impacted his anxiety in a negative way     Evie in to meet him in case he decides on counseling, in addition to medication, which he is considering                   Reviewed and updated as needed this visit by Provider  Tobacco  Allergies  Meds  Problems  Med Hx  Surg Hx  Fam Hx         Review of Systems   ROS COMP: Constitutional, HEENT, cardiovascular, pulmonary, gi and gu systems are negative, except as otherwise noted.      Objective    /60   Pulse 80   Temp 98.2  F (36.8  C) (Oral)   Resp 12   Ht 1.854 m (6' 1\")   Wt 81.6 kg (180 lb)   SpO2 97%   BMI 23.75 kg/m    Body mass index is 23.75 kg/m .  Physical Exam   GENERAL:  alert and no distress  RESP: lungs clear  CV: regular rate and rhythm  MS: no gross musculoskeletal defects noted  NEURO: Normal strength and tone, mentation intact and speech normal  PSYCH: mentation appears normal, affect normal/bright    Diagnostic Test Results:  Labs reviewed in [facility]  Lab         Assessment & Plan     1. Moderate episode of recurrent major depressive disorder (H)  Needs to start on medication   Contracts for safety   Willing to start on medication "   May consider counseling   - Vitamin D Deficiency  - TSH with free T4 reflex  - CBC with platelets and differential  - Comprehensive metabolic panel  - sertraline (ZOLOFT) 50 MG tablet; Take 1 tablet (50 mg) by mouth daily  Dispense: 30 tablet; Refill: 1    2. Anxiety  As above   - Vitamin D Deficiency  - TSH with free T4 reflex  - CBC with platelets and differential  - Comprehensive metabolic panel  - sertraline (ZOLOFT) 50 MG tablet; Take 1 tablet (50 mg) by mouth daily  Dispense: 30 tablet; Refill: 1    3. Attention deficit disorder, unspecified hyperactivity presence  No change in medication          Patient Instructions   Lab in [address]    Evie Goode counselor     Sertraline 50 mg daily   (Zoloft)    Follow up in a month     Appointment 25 minutes greater than 50% counseling on depression and anxiety     Return in about 4 weeks (around 10/2/2019).    TUYET Welch [facility]

## 2019-09-05 LAB
ALBUMIN SERPL-MCNC: 4.2 G/DL (ref 3.4–5)
ALP SERPL-CCNC: 51 U/L (ref 40–150)
ALT SERPL W P-5'-P-CCNC: 24 U/L (ref 0–70)
ANION GAP SERPL CALCULATED.3IONS-SCNC: 7 MMOL/L (ref 3–14)
AST SERPL W P-5'-P-CCNC: 19 U/L (ref 0–45)
BILIRUB SERPL-MCNC: 0.2 MG/DL (ref 0.2–1.3)
BUN SERPL-MCNC: 12 MG/DL (ref 7–30)
CALCIUM SERPL-MCNC: 9.2 MG/DL (ref 8.5–10.1)
CHLORIDE SERPL-SCNC: 104 MMOL/L (ref 94–109)
CO2 SERPL-SCNC: 29 MMOL/L (ref 20–32)
CREAT SERPL-MCNC: 0.9 MG/DL (ref 0.66–1.25)
DEPRECATED CALCIDIOL+CALCIFEROL SERPL-MC: 30 UG/L (ref 20–75)
GFR SERPL CREATININE-BSD FRML MDRD: >90 ML/MIN/{1.73_M2}
GLUCOSE SERPL-MCNC: 91 MG/DL (ref 70–99)
POTASSIUM SERPL-SCNC: 4.4 MMOL/L (ref 3.4–5.3)
PROT SERPL-MCNC: 7.6 G/DL (ref 6.8–8.8)
SODIUM SERPL-SCNC: 140 MMOL/L (ref 133–144)
TSH SERPL DL<=0.005 MIU/L-ACNC: 2.71 MU/L (ref 0.4–4)

## 2019-09-05 ASSESSMENT — ANXIETY QUESTIONNAIRES: GAD7 TOTAL SCORE: 12

## 2019-09-19 ENCOUNTER — THERAPY VISIT (OUTPATIENT)
Dept: CHIROPRACTIC MEDICINE | Facility: CLINIC | Age: 29
End: 2019-09-19
Payer: COMMERCIAL

## 2019-09-19 DIAGNOSIS — G89.29 CHRONIC BILATERAL LOW BACK PAIN WITHOUT SCIATICA: ICD-10-CM

## 2019-09-19 DIAGNOSIS — M99.02 THORACIC SEGMENT DYSFUNCTION: ICD-10-CM

## 2019-09-19 DIAGNOSIS — M99.04 SOMATIC DYSFUNCTION OF SACRAL REGION: ICD-10-CM

## 2019-09-19 DIAGNOSIS — M99.03 SOMATIC DYSFUNCTION OF LUMBAR REGION: Primary | ICD-10-CM

## 2019-09-19 DIAGNOSIS — M54.50 CHRONIC BILATERAL LOW BACK PAIN WITHOUT SCIATICA: ICD-10-CM

## 2019-09-19 PROCEDURE — 98941 CHIROPRACT MANJ 3-4 REGIONS: CPT | Mod: AT | Performed by: CHIROPRACTOR

## 2019-09-19 NOTE — PROGRESS NOTES
Visit #:  2    Subjective:  Wander Dinero is a 29 year old male who is seen in f/u up for:        Somatic dysfunction of lumbar region  Chronic bilateral low back pain without sciatica  Thoracic segment dysfunction  Somatic dysfunction of sacral region.     Since last visit on 4/10/2019,  Wander Dinero reports:    Area of chief complaint:  Thoracic and Lumbar :  Symptoms are graded at 4/10. The quality is described as stiff, achey, dull.  Motion has increased, but is still not normal. The pt is a patient of Dr. Winn. He reports moderate pain in the mid to low back area without radiation in the extremities. He states he has been playing volleyball and had noticed an increase in low back pain. The pt is seated at the computer for prolonged periods. The pt denies weakness in the extremities or other unusual sx. Patient feels that they feel worse.     Since last visit the patient feels that they are 0-10 percent  improved from last visit.       Objective:  The following was observed:    P: palpatory tenderness Lumbar erector spine and Quad lumb Bilaterally  A: static palpation demonstrates intersegmental asymmetry   R: motion palpation notes restricted motion  T: hypertonicity at: Lumbar erector spine and Quad lumb Bilaterally    Segmental spinal dysfunction/restrictions found at:  T5 , T9 , L5 , Sacrum  and PSIS Right       Assessment:    Diagnoses:      1. Somatic dysfunction of lumbar region    2. Chronic bilateral low back pain without sciatica    3. Thoracic segment dysfunction    4. Somatic dysfunction of sacral region        Patient's condition:  Patient had restrictions pre-manipulation    Treatment effectiveness:  Post manipulation there is better intersegmental movement and Patient claims to feel looser post manipulation      Procedures:  CMT:  23614 Chiropractic manipulative treatment 3-4 regions performed   Thoracic: Diversified, T5, T9, Prone  Lumbar: Diversified, L5, Side posture  Pelvis: Drop Table,  Sacrum , PSIS Right , Prone    Modalities:  None performed this visit    Therapeutic procedures:  None    Response to Treatment  Reduction in symptoms as reported by patient    Prognosis: Excellent      Progress towards Goals: Patient is making progress towards the goal.  Sit comfortably for 2 hours  Drive comfortably for  2 hours  Duration to achieve goal will be 3 weeks at a frequency of 1x per week       Recommendations:    Instructions:  be mindful of posture    Follow-up:    Return to care in 1 week with postural correction  US therapy.

## 2019-09-25 DIAGNOSIS — F98.8 ATTENTION DEFICIT DISORDER, UNSPECIFIED HYPERACTIVITY PRESENCE: ICD-10-CM

## 2019-09-25 DIAGNOSIS — Z79.899 CONTROLLED SUBSTANCE AGREEMENT SIGNED: ICD-10-CM

## 2019-09-25 NOTE — TELEPHONE ENCOUNTER
Requested Prescriptions   Pending Prescriptions Disp Refills     amphetamine-dextroamphetamine (ADDERALL) 30 MG tablet [Pharmacy Med Name: AMPHETAMINE SALTS 30MG TAB] 60 tablet 0     Sig: TAKE ONE TABLET BY MOUTH TWICE A DAY       There is no refill protocol information for this order      Last Written Prescription Date:  08/23/2019  Last Fill Quantity: 60,  # refills: 0   Last office visit: 9/4/2019 with prescribing provider:     Future Office Visit:

## 2019-09-26 RX ORDER — DEXTROAMPHETAMINE SACCHARATE, AMPHETAMINE ASPARTATE, DEXTROAMPHETAMINE SULFATE AND AMPHETAMINE SULFATE 7.5; 7.5; 7.5; 7.5 MG/1; MG/1; MG/1; MG/1
TABLET ORAL
Qty: 60 TABLET | Refills: 0 | Status: SHIPPED | OUTPATIENT
Start: 2019-09-26 | End: 2019-10-24

## 2019-09-26 NOTE — TELEPHONE ENCOUNTER
Controlled Substance Refill Request for Adderall  Problem List Complete:    No     PROVIDER TO CONSIDER COMPLETION OF PROBLEM LIST AND OVERVIEW/CONTROLLED SUBSTANCE AGREEMENT    Last Written Prescription Date:  8/23/19  Last Fill Quantity: 60,   # refills: 0    THE MOST RECENT OFFICE VISIT MUST BE WITHIN THE PAST 3 MONTHS. AT LEAST ONE FACE TO FACE VISIT MUST OCCUR EVERY 6 MONTHS. ADDITIONAL VISITS CAN BE VIRTUAL.  (THIS STATEMENT SHOULD BE DELETED.)    Last Office Visit with [facility] [place] provider: 9/4/19    Future Office visit:     Controlled substance agreement:   Encounter-Level CSA - 12/02/2016:    Controlled Substance Agreement - Scan on 12/7/2016  7:49 AM: CONTROLLED SUBSTANCE AGREEMENT     Patient-Level CSA:    Controlled Substance Agreement - Opioid - Scan on 12/18/2018  5:30 PM: NON-OPIOID CONTROLLED SUBSTANCE AGREEMENT         Last Urine Drug Screen:   Pain Drug SCR UR W RPTD Meds   Date Value Ref Range Status   12/18/2018 FINAL  Final     Comment:     (Note)  ====================================================================  [facility] COMP DRUG ANALYSIS,UR  ====================================================================  Test                             Result       Flag       Units        Drug Present   Amphetamine                    3761                    ng/mg creat    Amphetamine is available as a schedule II prescription drug.   Gabapentin                     PRESENT                               Acetaminophen                  PRESENT                               Dextromethorphan               PRESENT                               Dextrorphan/Levorphanol        PRESENT                                Dextrorphan is an expected metabolite of dextromethorphan, an    over-the-counter or prescription cough suppressant. Dextrorphan    cannot be distinguished from the scheduled prescription    medication levorphanol by the method used for analysis.   Guaifenesin                    PRESENT                                 Guaifenesin may be administered as an over-the-counter or    prescription drug; it may also be present as a breakdown product    of methocarbamol.  ====================================================================  Test                      Result    Flag   Units      Ref Range        Creatinine              72               mg/dL      >=20            ====================================================================  Declared Medications:  Medication list was not provided.  ====================================================================  For clinical consultation, please call [phone].  ====================================================================  Analysis performed by [facility], [address] [place] [address]     ,   [facility] Drug Analysis UR   Date Value Ref Range Status   12/28/2018 FINAL  Final     Comment:     (Note)  ====================================================================  COMPREHENSIVE DRUG ANALYSIS,UR  ====================================================================  Test                             Result       Flag       Units        Drug Present   Amphetamine                    >6944                   ng/mg creat    Amphetamine is available as a schedule II prescription drug.  ====================================================================  Test                      Result    Flag   Units      Ref Range        Creatinine              144              mg/dL      >=20            ====================================================================  For clinical consultation, please call [phone].  ====================================================================  Analysis performed by [facility] [address] [place] [address]     , No results found for: THC13, PCP13, COC13, MAMP13, OPI13, AMP13, BZO13, TCA13, MTD13, BAR13, OXY13, PPX13, BUP13     Processing:  Staff will hand deliver Rx to on-site pharmacy      [email or web address]       checked in past 3 months?  Yes checked on 7/22/19

## 2019-10-24 DIAGNOSIS — Z79.899 CONTROLLED SUBSTANCE AGREEMENT SIGNED: ICD-10-CM

## 2019-10-24 DIAGNOSIS — F98.8 ATTENTION DEFICIT DISORDER, UNSPECIFIED HYPERACTIVITY PRESENCE: ICD-10-CM

## 2019-10-24 NOTE — TELEPHONE ENCOUNTER
Requested Prescriptions   Pending Prescriptions Disp Refills     amphetamine-dextroamphetamine (ADDERALL) 30 MG tablet [Pharmacy Med Name: AMPHETAMINE-DEXTROAMPHETAMI 30 TABS] 60 tablet 0     Sig: TAKE ONE TABLET BY MOUTH TWICE A DAY       There is no refill protocol information for this order      Last Written Prescription Date:  09/26/2019  Last Fill Quantity: 60,  # refills: 0   Last office visit: 9/4/2019 with prescribing provider:     Future Office Visit:

## 2019-10-25 RX ORDER — DEXTROAMPHETAMINE SACCHARATE, AMPHETAMINE ASPARTATE, DEXTROAMPHETAMINE SULFATE AND AMPHETAMINE SULFATE 7.5; 7.5; 7.5; 7.5 MG/1; MG/1; MG/1; MG/1
TABLET ORAL
Qty: 60 TABLET | Refills: 0 | Status: SHIPPED | OUTPATIENT
Start: 2019-10-25 | End: 2019-12-02

## 2019-10-25 NOTE — TELEPHONE ENCOUNTER
Controlled Substance Refill Request for Adderall 30mg  Problem List Complete:    No     PROVIDER TO CONSIDER COMPLETION OF PROBLEM LIST AND OVERVIEW/CONTROLLED SUBSTANCE AGREEMENT    Last Written Prescription Date:  9-26-19  Last Fill Quantity: 60,   # refills: 0    THE MOST RECENT OFFICE VISIT MUST BE WITHIN THE PAST 3 MONTHS. AT LEAST ONE FACE TO FACE VISIT MUST OCCUR EVERY 6 MONTHS. ADDITIONAL VISITS CAN BE VIRTUAL.  (THIS STATEMENT SHOULD BE DELETED.)    Last Office Visit with [facility] primary care provider: 9-4-19    Future Office visit:     Controlled substance agreement:   Encounter-Level CSA - 12/02/2016:    Controlled Substance Agreement - Scan on 12/7/2016  7:49 AM: CONTROLLED SUBSTANCE AGREEMENT     Patient-Level CSA:    Controlled Substance Agreement - Opioid - Scan on 12/18/2018  5:30 PM: NON-OPIOID CONTROLLED SUBSTANCE AGREEMENT         Last Urine Drug Screen:   Pain Drug SCR UR W RPTD Meds   Date Value Ref Range Status   12/18/2018 FINAL  Final     Comment:     (Note)  ====================================================================  [facility] COMP DRUG ANALYSIS,UR  ====================================================================  Test                             Result       Flag       Units        Drug Present   Amphetamine                    3761                    ng/mg creat    Amphetamine is available as a schedule II prescription drug.   Gabapentin                     PRESENT                               Acetaminophen                  PRESENT                               Dextromethorphan               PRESENT                               Dextrorphan/Levorphanol        PRESENT                                Dextrorphan is an expected metabolite of dextromethorphan, an    over-the-counter or prescription cough suppressant. Dextrorphan    cannot be distinguished from the scheduled prescription    medication levorphanol by the method used for analysis.   Guaifenesin                    PRESENT                                 Guaifenesin may be administered as an over-the-counter or    prescription drug; it may also be present as a breakdown product    of methocarbamol.  ====================================================================  Test                      Result    Flag   Units      Ref Range        Creatinine              72               mg/dL      >=20            ====================================================================  Declared Medications:  Medication list was not provided.  ====================================================================  For clinical consultation, please call [phone].  ====================================================================  Analysis performed by [facility], [address] [place] [address]     ,   [facility] Drug Analysis UR   Date Value Ref Range Status   12/28/2018 FINAL  Final     Comment:     (Note)  ====================================================================  COMPREHENSIVE DRUG ANALYSIS,UR  ====================================================================  Test                             Result       Flag       Units        Drug Present   Amphetamine                    >6944                   ng/mg creat    Amphetamine is available as a schedule II prescription drug.  ====================================================================  Test                      Result    Flag   Units      Ref Range        Creatinine              144              mg/dL      >=20            ====================================================================  For clinical consultation, please call [phone].  ====================================================================  Analysis performed by [facility] [address] [place] [address]     , No results found for: THC13, PCP13, COC13, MAMP13, OPI13, AMP13, BZO13, TCA13, MTD13, BAR13, OXY13, PPX13, BUP13     Processing:  Rx to be electronically transmitted to pharmacy by  provider      [email or web address]       checked in past 3 months?  Yes 10-25-19  No concerns.    Please advise, thanks.

## 2019-12-02 DIAGNOSIS — F98.8 ATTENTION DEFICIT DISORDER, UNSPECIFIED HYPERACTIVITY PRESENCE: ICD-10-CM

## 2019-12-02 DIAGNOSIS — Z79.899 CONTROLLED SUBSTANCE AGREEMENT SIGNED: ICD-10-CM

## 2019-12-02 NOTE — TELEPHONE ENCOUNTER
Requested Prescriptions   Pending Prescriptions Disp Refills     amphetamine-dextroamphetamine (ADDERALL) 30 MG tablet [Pharmacy Med  Last Written Prescription Date:  10/25/2019  Last Fill Quantity: 60,  # refills: 0   Last office visit: 9/4/2019 with prescribing provider:     Future Office Visit:   Name: AMPHETAMINE-DEXTROAMPHETAMI 30 TABS] 60 tablet 0     Sig: TAKE ONE TABLET BY MOUTH TWICE A DAY       There is no refill protocol information for this order

## 2019-12-04 RX ORDER — DEXTROAMPHETAMINE SACCHARATE, AMPHETAMINE ASPARTATE, DEXTROAMPHETAMINE SULFATE AND AMPHETAMINE SULFATE 7.5; 7.5; 7.5; 7.5 MG/1; MG/1; MG/1; MG/1
TABLET ORAL
Qty: 60 TABLET | Refills: 0 | Status: SHIPPED | OUTPATIENT
Start: 2019-12-04 | End: 2020-01-08

## 2019-12-04 NOTE — TELEPHONE ENCOUNTER
[place] reviewed:  not reviewed/not due - last done on 10/25/19  [place] profile:  [email or web address]

## 2019-12-19 ENCOUNTER — TRANSFERRED RECORDS (OUTPATIENT)
Dept: HEALTH INFORMATION MANAGEMENT | Facility: CLINIC | Age: 29
End: 2019-12-19

## 2020-01-06 DIAGNOSIS — F98.8 ATTENTION DEFICIT DISORDER, UNSPECIFIED HYPERACTIVITY PRESENCE: ICD-10-CM

## 2020-01-06 DIAGNOSIS — Z79.899 CONTROLLED SUBSTANCE AGREEMENT SIGNED: ICD-10-CM

## 2020-01-06 NOTE — TELEPHONE ENCOUNTER
Requested Prescriptions   Pending Prescriptions Disp Refills     amphetamine-dextroamphetamine (ADDERALL) 30 MG tablet [Pharmacy Med Name: AMPHETAMINE-DEXTROAMPHETAMI 30 TABS] 60 tablet 0     Sig: TAKE ONE TABLET BY MOUTH TWICE A DAY  Last Written Prescription Date:  12/4/19  Last Fill Quantity: 60 tab,  # refills: 0   Last office visit: 9/4/2019 with prescribing provider:  Jaqueline Jarvis Office Visit:         There is no refill protocol information for this order

## 2020-01-08 RX ORDER — DEXTROAMPHETAMINE SACCHARATE, AMPHETAMINE ASPARTATE, DEXTROAMPHETAMINE SULFATE AND AMPHETAMINE SULFATE 7.5; 7.5; 7.5; 7.5 MG/1; MG/1; MG/1; MG/1
TABLET ORAL
Qty: 60 TABLET | Refills: 0 | Status: SHIPPED | OUTPATIENT
Start: 2020-01-08 | End: 2020-02-04

## 2020-02-03 DIAGNOSIS — F98.8 ATTENTION DEFICIT DISORDER, UNSPECIFIED HYPERACTIVITY PRESENCE: ICD-10-CM

## 2020-02-03 DIAGNOSIS — Z79.899 CONTROLLED SUBSTANCE AGREEMENT SIGNED: ICD-10-CM

## 2020-02-03 NOTE — TELEPHONE ENCOUNTER
Requested Prescriptions   Pending Prescriptions Disp Refills     amphetamine-dextroamphetamine (ADDERALL) 30 MG tablet [Pharmacy Med  Last Written Prescription Date:  1/8/2020  Last Fill Quantity: 60,  # refills: 0   Last office visit: 9/4/2019 with prescribing provider:     Future Office Visit:   Name: AMPHETAMINE-DEXTROAMPHETAMI 30 TABS] 60 tablet 0     Sig: TAKE ONE TABLET BY MOUTH TWICE A DAY       There is no refill protocol information for this order

## 2020-02-04 PROBLEM — Z79.899 CONTROLLED SUBSTANCE AGREEMENT SIGNED: Status: ACTIVE | Noted: 2018-12-18

## 2020-02-04 RX ORDER — DEXTROAMPHETAMINE SACCHARATE, AMPHETAMINE ASPARTATE, DEXTROAMPHETAMINE SULFATE AND AMPHETAMINE SULFATE 7.5; 7.5; 7.5; 7.5 MG/1; MG/1; MG/1; MG/1
TABLET ORAL
Qty: 60 TABLET | Refills: 0 | Status: SHIPPED | OUTPATIENT
Start: 2020-02-04 | End: 2020-03-06

## 2020-02-04 NOTE — TELEPHONE ENCOUNTER
Controlled Substance Refill Request for Adderall 30 mg  Problem List Complete:    Yes    Last Written Prescription Date:  1/8/2020  Last Fill Quantity: 60,   # refills: 0    Last Office Visit with [facility] primary care provider: 9/4/19    Future Office visit:     Controlled substance agreement:   Encounter-Level CSA - 12/02/2016:    Controlled Substance Agreement - Scan on 12/7/2016  7:49 AM: CONTROLLED SUBSTANCE AGREEMENT     Patient-Level CSA:    Controlled Substance Agreement - Opioid - Scan on 12/18/2018  5:30 PM: NON-OPIOID CONTROLLED SUBSTANCE AGREEMENT         Last Urine Drug Screen:   Pain Drug SCR UR W RPTD Meds   Date Value Ref Range Status   12/18/2018 FINAL  Final     Comment:     (Note)  ====================================================================  [facility] COMP DRUG ANALYSIS,UR  ====================================================================  Test                             Result       Flag       Units        Drug Present   Amphetamine                    3761                    ng/mg creat    Amphetamine is available as a schedule II prescription drug.   Gabapentin                     PRESENT                               Acetaminophen                  PRESENT                               Dextromethorphan               PRESENT                               Dextrorphan/Levorphanol        PRESENT                                Dextrorphan is an expected metabolite of dextromethorphan, an    over-the-counter or prescription cough suppressant. Dextrorphan    cannot be distinguished from the scheduled prescription    medication levorphanol by the method used for analysis.   Guaifenesin                    PRESENT                                Guaifenesin may be administered as an over-the-counter or    prescription drug; it may also be present as a breakdown product    of methocarbamol.  ====================================================================  Test                      Result     Flag   Units      Ref Range        Creatinine              72               mg/dL      >=20            ====================================================================  Declared Medications:  Medication list was not provided.  ====================================================================  For clinical consultation, please call [phone].  ====================================================================  Analysis performed by [facility], [address] [place] [address]     ,   [facility] Drug Analysis UR   Date Value Ref Range Status   12/28/2018 FINAL  Final     Comment:     (Note)  ====================================================================  COMPREHENSIVE DRUG ANALYSIS,UR  ====================================================================  Test                             Result       Flag       Units        Drug Present   Amphetamine                    >6944                   ng/mg creat    Amphetamine is available as a schedule II prescription drug.  ====================================================================  Test                      Result    Flag   Units      Ref Range        Creatinine              144              mg/dL      >=20            ====================================================================  For clinical consultation, please call [phone].  ====================================================================  Analysis performed by [facility] [address] [place] [address]     , No results found for: THC13, PCP13, COC13, MAMP13, OPI13, AMP13, BZO13, TCA13, MTD13, BAR13, OXY13, PPX13, BUP13     Processing:  Rx to be electronically transmitted to pharmacy by provider     [email or web address]  [place] reviewed:  reviewed- no concerns

## 2020-03-04 DIAGNOSIS — Z79.899 CONTROLLED SUBSTANCE AGREEMENT SIGNED: ICD-10-CM

## 2020-03-04 DIAGNOSIS — F98.8 ATTENTION DEFICIT DISORDER, UNSPECIFIED HYPERACTIVITY PRESENCE: ICD-10-CM

## 2020-03-04 NOTE — TELEPHONE ENCOUNTER
Requested Prescriptions   Pending Prescriptions Disp Refills     amphetamine-dextroamphetamine (ADDERALL) 30 MG tablet [Pharmacy Med Name: AMPHETAMINE-DEXTROAMPHETAMI 30 TABS] 60 tablet 0     Sig: TAKE ONE TABLET BY MOUTH TWICE A DAY       There is no refill protocol information for this order      Last Written Prescription Date:  02/04/2020  Last Fill Quantity: 60,  # refills: 0   Last office visit: 9/4/2019 with prescribing provider:     Future Office Visit:

## 2020-03-06 RX ORDER — DEXTROAMPHETAMINE SACCHARATE, AMPHETAMINE ASPARTATE, DEXTROAMPHETAMINE SULFATE AND AMPHETAMINE SULFATE 7.5; 7.5; 7.5; 7.5 MG/1; MG/1; MG/1; MG/1
TABLET ORAL
Qty: 60 TABLET | Refills: 0 | Status: SHIPPED | OUTPATIENT
Start: 2020-03-06 | End: 2020-04-14

## 2020-03-10 ENCOUNTER — HEALTH MAINTENANCE LETTER (OUTPATIENT)
Age: 30
End: 2020-03-10

## 2020-03-25 DIAGNOSIS — Z79.899 CONTROLLED SUBSTANCE AGREEMENT SIGNED: ICD-10-CM

## 2020-03-25 DIAGNOSIS — F98.8 ATTENTION DEFICIT DISORDER, UNSPECIFIED HYPERACTIVITY PRESENCE: ICD-10-CM

## 2020-03-25 RX ORDER — DEXTROAMPHETAMINE SACCHARATE, AMPHETAMINE ASPARTATE, DEXTROAMPHETAMINE SULFATE AND AMPHETAMINE SULFATE 7.5; 7.5; 7.5; 7.5 MG/1; MG/1; MG/1; MG/1
TABLET ORAL
Qty: 60 TABLET | Refills: 0 | OUTPATIENT
Start: 2020-03-25

## 2020-03-25 NOTE — TELEPHONE ENCOUNTER
Requested Prescriptions   Pending Prescriptions Disp Refills     amphetamine-dextroamphetamine (ADDERALL) 30 MG tablet [Pharmacy Med Name: AMPHETAMINE-DEXTROAMPHETAMI 30 TABS]  Last Written Prescription Date:  3/6/2020  Last Fill Quantity: 60,  # refills: 0   Last office visit: 9/4/2019 with prescribing provider:     Future Office Visit:   60 tablet 0     Sig: TAKE 1 TABLET BY MOUTH TWO TIMES A DAY       There is no refill protocol information for this order

## 2020-04-13 DIAGNOSIS — Z79.899 CONTROLLED SUBSTANCE AGREEMENT SIGNED: ICD-10-CM

## 2020-04-13 DIAGNOSIS — F98.8 ATTENTION DEFICIT DISORDER, UNSPECIFIED HYPERACTIVITY PRESENCE: ICD-10-CM

## 2020-04-13 NOTE — TELEPHONE ENCOUNTER
Requested Prescriptions   Pending Prescriptions Disp Refills     amphetamine-dextroamphetamine (ADDERALL) 30 MG tablet [Pharmacy Med Name: AMPHETAMINE-DEXTROAMPHETAMI 30 TABS] 60 tablet 0     Sig: TAKE 1 TABLET BY MOUTH TWO TIMES A DAY       There is no refill protocol information for this order      Last Written Prescription Date:  03/06/2020  Last Fill Quantity: 60,  # refills: 0   Last office visit: 9/4/2019 with prescribing provider:     Future Office Visit:

## 2020-04-14 RX ORDER — DEXTROAMPHETAMINE SACCHARATE, AMPHETAMINE ASPARTATE, DEXTROAMPHETAMINE SULFATE AND AMPHETAMINE SULFATE 7.5; 7.5; 7.5; 7.5 MG/1; MG/1; MG/1; MG/1
TABLET ORAL
Qty: 60 TABLET | Refills: 0 | Status: SHIPPED | OUTPATIENT
Start: 2020-04-14 | End: 2020-05-07

## 2020-04-14 NOTE — TELEPHONE ENCOUNTER
Requested Prescriptions   Pending Prescriptions Disp Refills     amphetamine-dextroamphetamine (ADDERALL) 30 MG tablet [Pharmacy Med Name: AMPHETAMINE-DEXTROAMPHETAMI 30 TABS] 60 tablet 0     Sig: TAKE 1 TABLET BY MOUTH TWO TIMES A DAY       There is no refill protocol information for this order        Routing refill request to provider for review/approval because:  Drug not on the [facility] refill protocol

## 2020-05-07 DIAGNOSIS — Z79.899 CONTROLLED SUBSTANCE AGREEMENT SIGNED: ICD-10-CM

## 2020-05-07 DIAGNOSIS — F98.8 ATTENTION DEFICIT DISORDER, UNSPECIFIED HYPERACTIVITY PRESENCE: ICD-10-CM

## 2020-05-07 RX ORDER — DEXTROAMPHETAMINE SACCHARATE, AMPHETAMINE ASPARTATE, DEXTROAMPHETAMINE SULFATE AND AMPHETAMINE SULFATE 7.5; 7.5; 7.5; 7.5 MG/1; MG/1; MG/1; MG/1
TABLET ORAL
Qty: 60 TABLET | Refills: 0 | Status: SHIPPED | OUTPATIENT
Start: 2020-05-07 | End: 2020-06-10

## 2020-06-08 DIAGNOSIS — Z79.899 CONTROLLED SUBSTANCE AGREEMENT SIGNED: ICD-10-CM

## 2020-06-08 DIAGNOSIS — F98.8 ATTENTION DEFICIT DISORDER, UNSPECIFIED HYPERACTIVITY PRESENCE: ICD-10-CM

## 2020-06-09 NOTE — TELEPHONE ENCOUNTER
Controlled Substance Refill Request for Adderall  Problem List Complete:    No     PROVIDER TO CONSIDER COMPLETION OF PROBLEM LIST AND OVERVIEW/CONTROLLED SUBSTANCE AGREEMENT    Last Written Prescription Date:  5/7/20  Last Fill Quantity: 60,   # refills: 0    Last Office Visit with [facility] primary care provider: 9/4/19    Future Office visit:     Controlled substance agreement:   Encounter-Level CSA - 12/02/2016:    Controlled Substance Agreement - Scan on 12/7/2016  7:49 AM: CONTROLLED SUBSTANCE AGREEMENT     Patient-Level CSA:    Controlled Substance Agreement - Opioid - Scan on 12/18/2018  5:30 PM: NON-OPIOID CONTROLLED SUBSTANCE AGREEMENT         Last Urine Drug Screen:   Pain Drug SCR UR W RPTD Meds   Date Value Ref Range Status   12/18/2018 FINAL  Final     Comment:     (Note)  ====================================================================  [facility] COMP DRUG ANALYSIS,UR  ====================================================================  Test                             Result       Flag       Units        Drug Present   Amphetamine                    3761                    ng/mg creat    Amphetamine is available as a schedule II prescription drug.   Gabapentin                     PRESENT                               Acetaminophen                  PRESENT                               Dextromethorphan               PRESENT                               Dextrorphan/Levorphanol        PRESENT                                Dextrorphan is an expected metabolite of dextromethorphan, an    over-the-counter or prescription cough suppressant. Dextrorphan    cannot be distinguished from the scheduled prescription    medication levorphanol by the method used for analysis.   Guaifenesin                    PRESENT                                Guaifenesin may be administered as an over-the-counter or    prescription drug; it may also be present as a breakdown product    of  methocarbamol.  ====================================================================  Test                      Result    Flag   Units      Ref Range        Creatinine              72               mg/dL      >=20            ====================================================================  Declared Medications:  Medication list was not provided.  ====================================================================  For clinical consultation, please call [phone].  ====================================================================  Analysis performed by [facility] [address] [place] [address]     ,   [facility] Drug Analysis UR   Date Value Ref Range Status   12/28/2018 FINAL  Final     Comment:     (Note)  ====================================================================  COMPREHENSIVE DRUG ANALYSIS,UR  ====================================================================  Test                             Result       Flag       Units        Drug Present   Amphetamine                    >6944                   ng/mg creat    Amphetamine is available as a schedule II prescription drug.  ====================================================================  Test                      Result    Flag   Units      Ref Range        Creatinine              144              mg/dL      >=20            ====================================================================  For clinical consultation, please call [phone].  ====================================================================  Analysis performed by [facility] [address] [place] [address]     , No results found for: THC13, PCP13, COC13, MAMP13, OPI13, AMP13, BZO13, TCA13, MTD13, BAR13, OXY13, PPX13, BUP13    [place] reviewed:  reviewed- no concerns  [place] profile:  [email or web address]

## 2020-06-10 RX ORDER — DEXTROAMPHETAMINE SACCHARATE, AMPHETAMINE ASPARTATE, DEXTROAMPHETAMINE SULFATE AND AMPHETAMINE SULFATE 7.5; 7.5; 7.5; 7.5 MG/1; MG/1; MG/1; MG/1
TABLET ORAL
Qty: 60 TABLET | Refills: 0 | Status: SHIPPED | OUTPATIENT
Start: 2020-06-10 | End: 2020-07-09

## 2020-07-08 DIAGNOSIS — F98.8 ATTENTION DEFICIT DISORDER, UNSPECIFIED HYPERACTIVITY PRESENCE: ICD-10-CM

## 2020-07-08 DIAGNOSIS — Z79.899 CONTROLLED SUBSTANCE AGREEMENT SIGNED: ICD-10-CM

## 2020-07-08 NOTE — LETTER
[facility]  07/09/20    Patient: Wander Dinero  YOB: 1990  Medical Record Number: [id number]  CSN: [id number]                                                                              Non-opioid Controlled Substance Agreement    I understand that my care provider has prescribed a controlled substance to help manage my condition(s). I am taking this medicine to help me function or work. I know this is strong medicine, and that it can cause serious side effects. Controlled substances can be sedating, addicting and may cause a dependency on the drug. They can affect my ability to drive or think, and cause depression. They need to be taken exactly as prescribed. Combining controlled substances with certain medicines or chemicals (such as cocaine, sedatives and tranquilizers, sleeping pills, meth) can be dangerous or even fatal. Also, if I stop controlled substances suddenly, I may have severe withdrawal symptoms.  If not helpful, I may be asked to stop them.    The risks, benefits, and side effects of these medicine(s) were explained to me. I agree that:    1. I will take part in other treatments as advised by my care team. This may be psychiatry or counseling, physical therapy, behavioral therapy, group treatment or a referral to a pain clinic. I will reduce or stop my medicine when my care team tells me to do so.  2. I will take my medicines as prescribed. I will not change the dose or schedule unless my care team tells me to. There will be no refills if I  run out early.   I may be contactedwithout warning and asked to complete a urine drug test or pill count at any time.   3. I will keep all my appointments, and understand this is part of the monitoring of controlled substances. My care team may require an office visit for EVERY controlled substance refill. If I miss appointments or don t follow instructions, my care team may stop my medicine.  4. I will not ask other providers to  prescribe controlled substances, and I will not accept controlled substances from other people. If I need another prescribed controlled substance for a new reason, I will tell my care team within 1 business day.  5. I will use one pharmacy to fill all of my controlled substance prescriptions, and it is up to me to make sure that I do not run out of my medicines on weekends or holidays. If my care team is willing to refill my controlled substance prescription without a visit, I must request refills only during office hours, refills may take up to 3 days to process, and it may take up to 5 to 7 days for my medicine to be mailed and ready at my pharmacy. Prescriptions will not be mailed anywhere except my pharmacy.    6. I am responsible for my prescriptions. If the medicine/prescription is lost or stolen, it will not be replaced. I also agree not to share controlled substance medicines with anyone.              [facility]  07/09/20  Patient:  Wander Dinero  YOB: 1990  Medical Record Number: [id number]  [facility]: [id number]    7. I agree to not use ANY illegal or recreational drugs. This includes marijuana, cocaine, bath salts or other drugs. I agree not to use alcohol unless my care team says I may. I agree to give urine samples whenever asked. If I don t give a urine sample, the care team may stop my medicine.    8. If I enroll in the [place] Medical Marijuana program, I will tell my care team. I will also sign an agreement to share my medical records with my care team.    9. I will bring in my list of medicines (or my medicine bottles) each time I come to the clinic.   10. I will tell my care team right away if I become pregnant or have a new medical problem treated outside of my regular clinic.  11. I understand that this medicine can affect my thinking and judgment. It may be unsafe for me to drive, use machinery and do dangerous tasks. I will not do any of these things until I know how the  medicine affects me. If my dose changes, I will wait to see how it affects me. I will contact my care team if I have concerns about medicine side effects.    I understand that if I do not follow any of the conditions above, my prescriptions or treatment may be stopped.      I agree that my provider, clinic care team, and pharmacy may work with any city, state or federal law enforcement agency that investigates the misuse, sale, or other diversion of my controlled medicine. I will allow my provider to discuss my care with or share a copy of this agreement with any other treating provider, pharmacy or emergency room where I receive care. I agree to give up (waive) any right of privacy or confidentiality with respect to these consents.   I have read this agreement and have asked questions about anything I did not understand.    ____________________________________________________    ____________  ________  Patient signature                                                         Date      Time    ____________________________________________________     ____________  ________  Witness                                                          Date      Time    ____________________________________________________  Provider signature

## 2020-07-09 ENCOUNTER — E-VISIT (OUTPATIENT)
Dept: INTERNAL MEDICINE | Facility: CLINIC | Age: 30
End: 2020-07-09
Payer: COMMERCIAL

## 2020-07-09 DIAGNOSIS — F98.8 ATTENTION DEFICIT DISORDER, UNSPECIFIED HYPERACTIVITY PRESENCE: Primary | ICD-10-CM

## 2020-07-09 PROCEDURE — 99207 ZZC NO BILLABLE SERVICE THIS VISIT: CPT | Performed by: NURSE PRACTITIONER

## 2020-07-09 RX ORDER — DEXTROAMPHETAMINE SACCHARATE, AMPHETAMINE ASPARTATE, DEXTROAMPHETAMINE SULFATE AND AMPHETAMINE SULFATE 7.5; 7.5; 7.5; 7.5 MG/1; MG/1; MG/1; MG/1
TABLET ORAL
Qty: 60 TABLET | Refills: 0 | Status: SHIPPED | OUTPATIENT
Start: 2020-07-09 | End: 2020-08-11

## 2020-07-09 NOTE — TELEPHONE ENCOUNTER
Patient is past due for appointment.   Consent to communicate on file, left voice message for patient that he is due for an appointment and to call the clinic to schedule an appointment. Advised patient that he could have the done as a virtual appointment.     Controlled Substance Refill Request for Adderall 30 mg  Problem List Complete:    Yes    Last Written Prescription Date:  6/10/2020  Last Fill Quantity: 60,   # refills: 0    Last Office Visit with [facility] primary care provider: 9/4/19    Future Office visit:     Controlled substance agreement:   Encounter-Level CSA - 12/02/2016:    Controlled Substance Agreement - Scan on 12/7/2016  7:49 AM: CONTROLLED SUBSTANCE AGREEMENT     Patient-Level CSA:    Controlled Substance Agreement - Opioid - Scan on 12/18/2018  5:30 PM: NON-OPIOID CONTROLLED SUBSTANCE AGREEMENT         Last Urine Drug Screen:   Pain Drug SCR UR W RPTD Meds   Date Value Ref Range Status   12/18/2018 FINAL  Final     Comment:     (Note)  ====================================================================  [facility] COMP DRUG ANALYSIS,UR  ====================================================================  Test                             Result       Flag       Units        Drug Present   Amphetamine                    3761                    ng/mg creat    Amphetamine is available as a schedule II prescription drug.   Gabapentin                     PRESENT                               Acetaminophen                  PRESENT                               Dextromethorphan               PRESENT                               Dextrorphan/Levorphanol        PRESENT                                Dextrorphan is an expected metabolite of dextromethorphan, an    over-the-counter or prescription cough suppressant. Dextrorphan    cannot be distinguished from the scheduled prescription    medication levorphanol by the method used for analysis.   Guaifenesin                    PRESENT                                 Guaifenesin may be administered as an over-the-counter or    prescription drug; it may also be present as a breakdown product    of methocarbamol.  ====================================================================  Test                      Result    Flag   Units      Ref Range        Creatinine              72               mg/dL      >=20            ====================================================================  Declared Medications:  Medication list was not provided.  ====================================================================  For clinical consultation, please call [phone].  ====================================================================  Analysis performed by [facility], [address] [place] [address]     ,   [facility] Drug Analysis UR   Date Value Ref Range Status   12/28/2018 FINAL  Final     Comment:     (Note)  ====================================================================  COMPREHENSIVE DRUG ANALYSIS,UR  ====================================================================  Test                             Result       Flag       Units        Drug Present   Amphetamine                    >6944                   ng/mg creat    Amphetamine is available as a schedule II prescription drug.  ====================================================================  Test                      Result    Flag   Units      Ref Range        Creatinine              144              mg/dL      >=20            ====================================================================  For clinical consultation, please call [phone].  ====================================================================  Analysis performed by [facility] [place] [address]     , No results found for: THC13, PCP13, COC13, MAMP13, OPI13, AMP13, BZO13, TCA13, MTD13, BAR13, OXY13, PPX13, BUP13     Processing:  Rx to be electronically transmitted to pharmacy by provider      [email or web address]    [place] reviewed:  reviewed- no concerns

## 2020-07-09 NOTE — TELEPHONE ENCOUNTER
Spoke with patient and verified address.  Patient was advised to sign in front of a witness, and have the witness sign as well.  Then mail back to the clinic.    States he will do an E-visit thru his [employer] account.    Controlled substance agreement form mailed to home address.

## 2020-07-09 NOTE — TELEPHONE ENCOUNTER
Remind him new rules are appointment every 6 months   If he wants to do e visit and say dose is working well and no changes desired, that would be ok   Needs to sign CSA- last in 2018 and this has to signed yearly - can we mail to him     Will not fill next month without an appointment of some type

## 2020-07-09 NOTE — TELEPHONE ENCOUNTER
Provider E-Visit time total (minutes): 10    I assume patient did this for adderall refill but he did not mention why it was for and that his medication workng - I sent a my chat  Rt for him to clarify, but may need phone call     adderall has worked for hi at this dose and no changes desired

## 2020-07-09 NOTE — TELEPHONE ENCOUNTER
Patient called back and will do a E visit. Not sure if CSA can be mailed. If so please mail this to him if not please call patient

## 2020-08-10 DIAGNOSIS — Z79.899 CONTROLLED SUBSTANCE AGREEMENT SIGNED: ICD-10-CM

## 2020-08-10 DIAGNOSIS — F98.8 ATTENTION DEFICIT DISORDER, UNSPECIFIED HYPERACTIVITY PRESENCE: ICD-10-CM

## 2020-08-11 RX ORDER — DEXTROAMPHETAMINE SACCHARATE, AMPHETAMINE ASPARTATE, DEXTROAMPHETAMINE SULFATE AND AMPHETAMINE SULFATE 7.5; 7.5; 7.5; 7.5 MG/1; MG/1; MG/1; MG/1
TABLET ORAL
Qty: 60 TABLET | Refills: 0 | Status: SHIPPED | OUTPATIENT
Start: 2020-08-11 | End: 2020-10-07

## 2020-08-11 NOTE — TELEPHONE ENCOUNTER
Controlled Substance Refill Request for Adderall   Problem List Complete:    No     PROVIDER TO CONSIDER COMPLETION OF PROBLEM LIST AND OVERVIEW/CONTROLLED SUBSTANCE AGREEMENT    Last Written Prescription Date:  7/9/20  Last Fill Quantity: 60,   # refills: 0    [place] Office Visit with [facility] [place] care provider: 7/9/20    Future Office visit:     Controlled substance agreement:   Encounter-Level CSA - 12/02/2016:    Controlled Substance Agreement - Scan on 12/7/2016  7:49 AM: CONTROLLED SUBSTANCE AGREEMENT     Patient-Level CSA:    Controlled Substance Agreement - Opioid - Scan on 12/18/2018  5:30 PM: NON-OPIOID CONTROLLED SUBSTANCE AGREEMENT         Last Urine Drug Screen:   Pain Drug SCR UR W RPTD Meds   Date Value Ref Range Status   12/18/2018 FINAL  Final     Comment:     (Note)  ====================================================================  [facility] COMP DRUG ANALYSIS,UR  ====================================================================  Test                             Result       Flag       Units        Drug Present   Amphetamine                    3761                    ng/mg creat    Amphetamine is available as a schedule II prescription drug.   Gabapentin                     PRESENT                               Acetaminophen                  PRESENT                               Dextromethorphan               PRESENT                               Dextrorphan/Levorphanol        PRESENT                                Dextrorphan is an expected metabolite of dextromethorphan, an    over-the-counter or prescription cough suppressant. Dextrorphan    cannot be distinguished from the scheduled prescription    medication levorphanol by the method used for analysis.   Guaifenesin                    PRESENT                                Guaifenesin may be administered as an over-the-counter or    prescription drug; it may also be present as a breakdown product    of  methocarbamol.  ====================================================================  Test                      Result    Flag   Units      Ref Range        Creatinine              72               mg/dL      >=20            ====================================================================  Declared Medications:  Medication list was not provided.  ====================================================================  For clinical consultation, please call [phone].  ====================================================================  Analysis performed by [facility] [place] [address]     ,   [facility] Drug Analysis UR   Date Value Ref Range Status   12/28/2018 FINAL  Final     Comment:     (Note)  ====================================================================  COMPREHENSIVE DRUG ANALYSIS,UR  ====================================================================  Test                             Result       Flag       Units        Drug Present   Amphetamine                    >6944                   ng/mg creat    Amphetamine is available as a schedule II prescription drug.  ====================================================================  Test                      Result    Flag   Units      Ref Range        Creatinine              144              mg/dL      >=20            ====================================================================  For clinical consultation, please call [phone].  ====================================================================  Analysis performed by [facility] [place] [address]     , No results found for: THC13, PCP13, COC13, MAMP13, OPI13, AMP13, BZO13, TCA13, MTD13, BAR13, OXY13, PPX13, BUP13    [email or web address]       checked in past 3 months?  Yes 7/9/20

## 2020-10-05 DIAGNOSIS — F98.8 ATTENTION DEFICIT DISORDER, UNSPECIFIED HYPERACTIVITY PRESENCE: ICD-10-CM

## 2020-10-05 DIAGNOSIS — Z79.899 CONTROLLED SUBSTANCE AGREEMENT SIGNED: ICD-10-CM

## 2020-10-07 RX ORDER — DEXTROAMPHETAMINE SACCHARATE, AMPHETAMINE ASPARTATE, DEXTROAMPHETAMINE SULFATE AND AMPHETAMINE SULFATE 7.5; 7.5; 7.5; 7.5 MG/1; MG/1; MG/1; MG/1
TABLET ORAL
Qty: 60 TABLET | Refills: 0 | Status: SHIPPED | OUTPATIENT
Start: 2020-10-07 | End: 2020-11-30

## 2020-10-07 NOTE — TELEPHONE ENCOUNTER
Controlled Substance Refill Request for Adderall  Problem List Complete:    No     PROVIDER TO CONSIDER COMPLETION OF PROBLEM LIST AND OVERVIEW/CONTROLLED SUBSTANCE AGREEMENT    Last Written Prescription Date:  8/11/20  Last Fill Quantity: 60,   # refills: 0    Last Office Visit with [facility] primary care provider: 7/9/20    Future Office visit:     Controlled substance agreement:   Encounter-Level CSA - 12/02/2016:    Controlled Substance Agreement - Scan on 12/7/2016  7:49 AM: CONTROLLED SUBSTANCE AGREEMENT     Patient-Level CSA:    Controlled Substance Agreement - Non - Opioid - Scan on 8/18/2020 12:58 AM: NON-OPIOID CONTROLLED SUBSTANCE AGREEMENT  Controlled Substance Agreement - Opioid - Scan on 12/18/2018  5:30 PM: NON-OPIOID CONTROLLED SUBSTANCE AGREEMENT         Last Urine Drug Screen:   Pain Drug SCR UR W RPTD Meds   Date Value Ref Range Status   12/18/2018 FINAL  Final     Comment:     (Note)  ====================================================================  TOX[facility]RE COMP DRUG ANALYSIS,UR  ====================================================================  Test                             Result       Flag       Units        Drug Present   Amphetamine                    3761                    ng/mg creat    Amphetamine is available as a schedule II prescription drug.   Gabapentin                     PRESENT                               Acetaminophen                  PRESENT                               Dextromethorphan               PRESENT                               Dextrorphan/Levorphanol        PRESENT                                Dextrorphan is an expected metabolite of dextromethorphan, an    over-the-counter or prescription cough suppressant. Dextrorphan    cannot be distinguished from the scheduled prescription    medication levorphanol by the method used for analysis.   Guaifenesin                    PRESENT                                Guaifenesin may be administered as an  over-the-counter or    prescription drug; it may also be present as a breakdown product    of methocarbamol.  ====================================================================  Test                      Result    Flag   Units      Ref Range        Creatinine              72               mg/dL      >=20            ====================================================================  Declared Medications:  Medication list was not provided.  ====================================================================  For clinical consultation, please call [phone].  ====================================================================  Analysis performed by [facility], [facility] [place] [address]     ,   [facility] Drug Analysis UR   Date Value Ref Range Status   12/28/2018 FINAL  Final     Comment:     (Note)  ====================================================================  COMPREHENSIVE DRUG ANALYSIS,UR  ====================================================================  Test                             Result       Flag       Units        Drug Present   Amphetamine                    >6944                   ng/mg creat    Amphetamine is available as a schedule II prescription drug.  ====================================================================  Test                      Result    Flag   Units      Ref Range        Creatinine              144              mg/dL      >=20            ====================================================================  For clinical consultation, please call [phone].  ====================================================================  Analysis performed by [employer] [employer] [place] [address]     , No results found for: THC13, PCP13, COC13, MAMP13, OPI13, AMP13, BZO13, TCA13, MTD13, BAR13, OXY13, PPX13, BUP13      [email or web address]       checked in past 3 months?  Yes 7/9/20

## 2020-11-27 DIAGNOSIS — Z79.899 CONTROLLED SUBSTANCE AGREEMENT SIGNED: ICD-10-CM

## 2020-11-27 DIAGNOSIS — F98.8 ATTENTION DEFICIT DISORDER, UNSPECIFIED HYPERACTIVITY PRESENCE: ICD-10-CM

## 2020-11-30 RX ORDER — DEXTROAMPHETAMINE SACCHARATE, AMPHETAMINE ASPARTATE, DEXTROAMPHETAMINE SULFATE AND AMPHETAMINE SULFATE 7.5; 7.5; 7.5; 7.5 MG/1; MG/1; MG/1; MG/1
TABLET ORAL
Qty: 60 TABLET | Refills: 0 | Status: SHIPPED | OUTPATIENT
Start: 2020-11-30 | End: 2021-01-06

## 2020-12-27 ENCOUNTER — HEALTH MAINTENANCE LETTER (OUTPATIENT)
Age: 30
End: 2020-12-27

## 2021-01-05 DIAGNOSIS — Z79.899 CONTROLLED SUBSTANCE AGREEMENT SIGNED: ICD-10-CM

## 2021-01-05 DIAGNOSIS — F98.8 ATTENTION DEFICIT DISORDER, UNSPECIFIED HYPERACTIVITY PRESENCE: ICD-10-CM

## 2021-01-06 RX ORDER — DEXTROAMPHETAMINE SACCHARATE, AMPHETAMINE ASPARTATE, DEXTROAMPHETAMINE SULFATE AND AMPHETAMINE SULFATE 7.5; 7.5; 7.5; 7.5 MG/1; MG/1; MG/1; MG/1
TABLET ORAL
Qty: 60 TABLET | Refills: 0 | Status: SHIPPED | OUTPATIENT
Start: 2021-01-06 | End: 2021-02-03

## 2021-01-06 NOTE — TELEPHONE ENCOUNTER
Controlled Substance Refill Request for Adderall  Problem List Complete:    No     PROVIDER TO CONSIDER COMPLETION OF PROBLEM LIST AND OVERVIEW/CONTROLLED SUBSTANCE AGREEMENT    Last Written Prescription Date:  11/30/20  Last Fill Quantity: 60,   # refills: 0    Last Office Visit with [facility] primary care provider: 7/9/20    Future Office visit:     Controlled substance agreement:   Encounter-Level CSA - 12/02/2016:    Controlled Substance Agreement - Scan on 12/7/2016  7:49 AM: CONTROLLED SUBSTANCE AGREEMENT     Patient-Level CSA:    Controlled Substance Agreement - Non - Opioid - Scan on 8/18/2020 12:58 AM: NON-OPIOID CONTROLLED SUBSTANCE AGREEMENT  Controlled Substance Agreement - Opioid - Scan on 12/18/2018  5:30 PM: NON-OPIOID CONTROLLED SUBSTANCE AGREEMENT         Last Urine Drug Screen:   Pain Drug SCR UR W RPTD Meds   Date Value Ref Range Status   12/18/2018 FINAL  Final     Comment:     (Note)  ====================================================================  [facility]RE COMP DRUG ANALYSIS,UR  ====================================================================  Test                             Result       Flag       Units        Drug Present   Amphetamine                    3761                    ng/mg creat    Amphetamine is available as a schedule II prescription drug.   Gabapentin                     PRESENT                               Acetaminophen                  PRESENT                               Dextromethorphan               PRESENT                               Dextrorphan/Levorphanol        PRESENT                                Dextrorphan is an expected metabolite of dextromethorphan, an    over-the-counter or prescription cough suppressant. Dextrorphan    cannot be distinguished from the scheduled prescription    medication levorphanol by the method used for analysis.   Guaifenesin                    PRESENT                                Guaifenesin may be administered as an  over-the-counter or    prescription drug; it may also be present as a breakdown product    of methocarbamol.  ====================================================================  Test                      Result    Flag   Units      Ref Range        Creatinine              72               mg/dL      >=20            ====================================================================  Declared Medications:  Medication list was not provided.  ====================================================================  For clinical consultation, please call [phone].  ====================================================================  Analysis performed by [email or web address], [facility] [place] [address]     ,   [facility] Drug Analysis UR   Date Value Ref Range Status   12/28/2018 FINAL  Final     Comment:     (Note)  ====================================================================  COMPREHENSIVE DRUG ANALYSIS,UR  ====================================================================  Test                             Result       Flag       Units        Drug Present   Amphetamine                    >6944                   ng/mg creat    Amphetamine is available as a schedule II prescription drug.  ====================================================================  Test                      Result    Flag   Units      Ref Range        Creatinine              144              mg/dL      >=20            ====================================================================  For clinical consultation, please call [phone].  ====================================================================  Analysis performed by [email or web address] [place] [address]     , No results found for: THC13, PCP13, COC13, MAMP13, OPI13, AMP13, BZO13, TCA13, MTD13, BAR13, OXY13, PPX13, BUP13     [place] reviewed:  reviewed- no concerns  [place] profile:  [email or web address]

## 2021-02-03 ENCOUNTER — OFFICE VISIT (OUTPATIENT)
Dept: INTERNAL MEDICINE | Facility: CLINIC | Age: 31
End: 2021-02-03
Payer: COMMERCIAL

## 2021-02-03 VITALS
DIASTOLIC BLOOD PRESSURE: 72 MMHG | OXYGEN SATURATION: 100 % | HEART RATE: 67 BPM | BODY MASS INDEX: 22.93 KG/M2 | TEMPERATURE: 98.2 F | RESPIRATION RATE: 24 BRPM | HEIGHT: 73 IN | SYSTOLIC BLOOD PRESSURE: 124 MMHG | WEIGHT: 173 LBS

## 2021-02-03 DIAGNOSIS — Z00.00 LABORATORY EXAMINATION ORDERED AS PART OF A ROUTINE GENERAL MEDICAL EXAMINATION: ICD-10-CM

## 2021-02-03 DIAGNOSIS — Z79.899 CONTROLLED SUBSTANCE AGREEMENT SIGNED: ICD-10-CM

## 2021-02-03 DIAGNOSIS — F41.9 ANXIETY: ICD-10-CM

## 2021-02-03 DIAGNOSIS — R09.81 NASAL CONGESTION: ICD-10-CM

## 2021-02-03 DIAGNOSIS — F98.8 ATTENTION DEFICIT DISORDER, UNSPECIFIED HYPERACTIVITY PRESENCE: ICD-10-CM

## 2021-02-03 DIAGNOSIS — F33.1 MODERATE EPISODE OF RECURRENT MAJOR DEPRESSIVE DISORDER (H): Primary | ICD-10-CM

## 2021-02-03 DIAGNOSIS — Z11.59 NEED FOR HEPATITIS C SCREENING TEST: ICD-10-CM

## 2021-02-03 DIAGNOSIS — L70.0 ACNE VULGARIS: ICD-10-CM

## 2021-02-03 PROCEDURE — 99214 OFFICE O/P EST MOD 30 MIN: CPT | Performed by: NURSE PRACTITIONER

## 2021-02-03 RX ORDER — DEXTROAMPHETAMINE SACCHARATE, AMPHETAMINE ASPARTATE, DEXTROAMPHETAMINE SULFATE AND AMPHETAMINE SULFATE 7.5; 7.5; 7.5; 7.5 MG/1; MG/1; MG/1; MG/1
TABLET ORAL
Qty: 60 TABLET | Refills: 0 | Status: SHIPPED | OUTPATIENT
Start: 2021-02-03 | End: 2021-03-10

## 2021-02-03 RX ORDER — ADAPALENE 0.1 G/100G
CREAM TOPICAL AT BEDTIME
Qty: 45 G | Refills: 11 | Status: SHIPPED | OUTPATIENT
Start: 2021-02-03 | End: 2022-03-16

## 2021-02-03 RX ORDER — CLINDAMYCIN PHOSPHATE 10 UG/ML
LOTION TOPICAL 2 TIMES DAILY
Qty: 60 ML | Refills: 11 | Status: SHIPPED | OUTPATIENT
Start: 2021-02-03 | End: 2022-03-16

## 2021-02-03 ASSESSMENT — ANXIETY QUESTIONNAIRES
1. FEELING NERVOUS, ANXIOUS, OR ON EDGE: SEVERAL DAYS
3. WORRYING TOO MUCH ABOUT DIFFERENT THINGS: SEVERAL DAYS
GAD7 TOTAL SCORE: 6
6. BECOMING EASILY ANNOYED OR IRRITABLE: SEVERAL DAYS
7. FEELING AFRAID AS IF SOMETHING AWFUL MIGHT HAPPEN: SEVERAL DAYS
2. NOT BEING ABLE TO STOP OR CONTROL WORRYING: NOT AT ALL
IF YOU CHECKED OFF ANY PROBLEMS ON THIS QUESTIONNAIRE, HOW DIFFICULT HAVE THESE PROBLEMS MADE IT FOR YOU TO DO YOUR WORK, TAKE CARE OF THINGS AT HOME, OR GET ALONG WITH OTHER PEOPLE: SOMEWHAT DIFFICULT
5. BEING SO RESTLESS THAT IT IS HARD TO SIT STILL: SEVERAL DAYS

## 2021-02-03 ASSESSMENT — PATIENT HEALTH QUESTIONNAIRE - PHQ9
5. POOR APPETITE OR OVEREATING: SEVERAL DAYS
SUM OF ALL RESPONSES TO PHQ QUESTIONS 1-9: 8

## 2021-02-03 ASSESSMENT — MIFFLIN-ST. JEOR: SCORE: 1793.6

## 2021-02-03 NOTE — PATIENT INSTRUCTIONS
Fasting lab   You need to be fasting for 12hrs. You can have water and any meds you are on. But, no food, coffee, tea or diet pop.    Medication refilled     N: [facility] [place] [facility] [place] [phone]   [email or web address]    Consider Xyzal   Or Zyrtec - at bedtime

## 2021-02-03 NOTE — NURSING NOTE
"Chief Complaint   Patient presents with     Nasal Congestion     pt states nasal congestion has been going on for years and now has gotten worse making more difficult to breathe at noc.      initial /72   Pulse 67   Temp 98.2  F (36.8  C) (Oral)   Resp 24   Ht 1.854 m (6' 1\")   Wt 78.5 kg (173 lb)   SpO2 100%   BMI 22.82 kg/m   Estimated body mass index is 22.82 kg/m  as calculated from the following:    Height as of this encounter: 1.854 m (6' 1\").    Weight as of this encounter: 78.5 kg (173 lb)..  bp completed using cuff size regular  DORENE ANSARI LPN  "

## 2021-02-03 NOTE — PROGRESS NOTES
Assessment & Plan     Moderate episode of recurrent major depressive disorder (H)  Stable on medication   - sertraline (ZOLOFT) 50 MG tablet; Take 1 tablet (50 mg) by mouth daily    Anxiety  Stable on medication   - sertraline (ZOLOFT) 50 MG tablet; Take 1 tablet (50 mg) by mouth daily    Attention deficit disorder, unspecified hyperactivity presence  Stable on medication   No dose change desired    - Comprehensive metabolic panel (BMP + Alb, Alk Phos, ALT, AST, Total. Bili, TP); Future  - TSH with free T4 reflex; Future  - CBC with platelets and differential; Future  - Vitamin D Deficiency; Future  - amphetamine-dextroamphetamine (ADDERALL) 30 MG tablet; TAKE ONE TABLET BY MOUTH TWICE A DAY    Need for hepatitis C screening test    - Hepatitis C Screen Reflex to HCV RNA Quant and Genotype; Future    Laboratory examination ordered as part of a routine general medical examination    - Lipid panel reflex to direct LDL Fasting; Future  - Comprehensive metabolic panel (BMP + Alb, Alk Phos, ALT, AST, Total. Bili, TP); Future  - TSH with free T4 reflex; Future  - CBC with platelets and differential; Future  - Vitamin D Deficiency; Future  - Hepatitis C Screen Reflex to HCV RNA Quant and Genotype; Future    Controlled substance agreement signed    - amphetamine-dextroamphetamine (ADDERALL) 30 MG tablet; TAKE ONE TABLET BY MOUTH TWICE A DAY    Acne vulgaris    - adapalene (DIFFERIN) 0.1 % external cream; Apply topically At Bedtime  - clindamycin (CLEOCIN T) 1 % external lotion; Apply topically 2 times daily    Nasal congestion    - OTOLARYNGOLOGY REFERRAL              21 minutes spent on the date of the encounter doing chart review, history and exam, documentation and further activities as noted above           Patient Instructions   Fasting lab   You need to be fasting for 12hrs. You can have water and any meds you are on. But, no food, coffee, tea or diet pop.    Medication refilled     [facility]  [facility] [phone]   [email or web address]    Consider Xyzal   Or Zyrtec - at bedtime               Return in about 6 months (around 8/3/2021) for adderall.    TUYET Welch CNP  [facility] MARY Viramontes is a 30 year old who presents to clinic today for the following health issues medication check     HPI   Chief Complaint   Patient presents with     Nasal Congestion     pt states nasal congestion has been going on for years and now has gotten worse making more difficult to breathe at noc.   Has pressure in face at times   Tried nasal spray years ago - not sure if it worked at all    suggested trial of allergy medication      Needs adderall refill  Working well for him     zoloft working well for him             Review of Systems   Constitutional, HEENT, cardiovascular, pulmonary, GI, , musculoskeletal, neuro, skin, endocrine and psych systems are negative, except as otherwise noted.      Objective    There were no vitals taken for this visit.  There is no height or weight on file to calculate BMI.  Physical Exam   GENERAL: alert and no distress  HENT: ear canals and TM's normal, nose with mild erythema and mouth without ulcers or lesions  RESP: lungs clear to auscultation - no rales, rhonchi or wheezes  CV: regular rate and rhythm, normal S1 S2, no S3 or S4, no murmur, click or rub, no peripheral edema and peripheral pulses strong  MS: no gross musculoskeletal defects noted, no edema  NEURO: Normal strength and tone, mentation intact and speech normal  PSYCH: mentation appears normal, affect normal/bright    Lab future

## 2021-02-04 ASSESSMENT — ANXIETY QUESTIONNAIRES: GAD7 TOTAL SCORE: 6

## 2021-03-08 DIAGNOSIS — F98.8 ATTENTION DEFICIT DISORDER, UNSPECIFIED HYPERACTIVITY PRESENCE: ICD-10-CM

## 2021-03-08 DIAGNOSIS — Z79.899 CONTROLLED SUBSTANCE AGREEMENT SIGNED: ICD-10-CM

## 2021-03-10 RX ORDER — DEXTROAMPHETAMINE SACCHARATE, AMPHETAMINE ASPARTATE, DEXTROAMPHETAMINE SULFATE AND AMPHETAMINE SULFATE 7.5; 7.5; 7.5; 7.5 MG/1; MG/1; MG/1; MG/1
TABLET ORAL
Qty: 60 TABLET | Refills: 0 | Status: SHIPPED | OUTPATIENT
Start: 2021-03-10 | End: 2021-04-16

## 2021-03-10 NOTE — TELEPHONE ENCOUNTER
Controlled Substance Refill Request for Adderall  Problem List Complete:    No     PROVIDER TO CONSIDER COMPLETION OF PROBLEM LIST AND OVERVIEW/CONTROLLED SUBSTANCE AGREEMENT    Last Written Prescription Date:  2/3/21  Last Fill Quantity: 60,   # refills: 0    [place] [facility] [place]: 2/3/21    Future Office visit:     Controlled substance agreement:   Encounter-Level CSA - 12/02/2016:    Controlled Substance Agreement - Scan on 12/7/2016  7:49 AM: CONTROLLED SUBSTANCE AGREEMENT     Patient-Level CSA:    Controlled Substance Agreement - Non - Opioid - Scan on 8/18/2020 12:58 AM: NON-OPIOID CONTROLLED SUBSTANCE AGREEMENT  Controlled Substance Agreement - Opioid - Scan on 12/18/2018  5:30 PM: NON-OPIOID CONTROLLED SUBSTANCE AGREEMENT         Last Urine Drug Screen:   Pain Drug SCR UR W RPTD Meds   Date Value Ref Range Status   12/18/2018 FINAL  Final     Comment:     (Note)  ====================================================================  [facility] COMP DRUG ANALYSIS,UR  ====================================================================  Test                             Result       Flag       Units        Drug Present   Amphetamine                    3761                    ng/mg creat    Amphetamine is available as a schedule II prescription drug.   Gabapentin                     PRESENT                               Acetaminophen                  PRESENT                               Dextromethorphan               PRESENT                               Dextrorphan/Levorphanol        PRESENT                                Dextrorphan is an expected metabolite of dextromethorphan, an    over-the-counter or prescription cough suppressant. Dextrorphan    cannot be distinguished from the scheduled prescription    medication levorphanol by the method used for analysis.   Guaifenesin                    PRESENT                                Guaifenesin may be administered as an  over-the-counter or    prescription drug; it may also be present as a breakdown product    of methocarbamol.  ====================================================================  Test                      Result    Flag   Units      Ref Range        Creatinine              72               mg/dL      >=20            ====================================================================  Declared Medications:  Medication list was not provided.  ====================================================================  For clinical consultation, please call [phone].  ====================================================================  Analysis performed by [employer], [facility] [place] [address]     ,   [facility] Drug Analysis UR   Date Value Ref Range Status   12/28/2018 FINAL  Final     Comment:     (Note)  ====================================================================  COMPREHENSIVE DRUG ANALYSIS,UR  ====================================================================  Test                             Result       Flag       Units        Drug Present   Amphetamine                    >6944                   ng/mg creat    Amphetamine is available as a schedule II prescription drug.  ====================================================================  Test                      Result    Flag   Units      Ref Range        Creatinine              144              mg/dL      >=20            ====================================================================  For clinical consultation, please call [phone].  ====================================================================  Analysis performed by [employer] [place] [address]     , No results found for: THC13, PCP13, COC13, MAMP13, OPI13, AMP13, BZO13, TCA13, MTD13, BAR13, OXY13, PPX13, BUP13     [place] reviewed:  not reviewed/not due - last done on 1/6/21  [place] profile:   [email or web address]

## 2021-04-14 DIAGNOSIS — F98.8 ATTENTION DEFICIT DISORDER, UNSPECIFIED HYPERACTIVITY PRESENCE: ICD-10-CM

## 2021-04-14 DIAGNOSIS — Z79.899 CONTROLLED SUBSTANCE AGREEMENT SIGNED: ICD-10-CM

## 2021-04-16 RX ORDER — DEXTROAMPHETAMINE SACCHARATE, AMPHETAMINE ASPARTATE, DEXTROAMPHETAMINE SULFATE AND AMPHETAMINE SULFATE 7.5; 7.5; 7.5; 7.5 MG/1; MG/1; MG/1; MG/1
TABLET ORAL
Qty: 60 TABLET | Refills: 0 | Status: SHIPPED | OUTPATIENT
Start: 2021-04-16 | End: 2021-05-25

## 2021-04-24 ENCOUNTER — HEALTH MAINTENANCE LETTER (OUTPATIENT)
Age: 31
End: 2021-04-24

## 2021-05-22 DIAGNOSIS — Z79.899 CONTROLLED SUBSTANCE AGREEMENT SIGNED: ICD-10-CM

## 2021-05-22 DIAGNOSIS — F98.8 ATTENTION DEFICIT DISORDER, UNSPECIFIED HYPERACTIVITY PRESENCE: ICD-10-CM

## 2021-05-24 NOTE — TELEPHONE ENCOUNTER
Pending Prescriptions:                       Disp   Refills    amphetamine-dextroamphetamine (ADDERALL) 3*60 tab*0        Sig: TAKE ONE TABLET BY MOUTH TWICE A DAY    Routing refill request to provider for review/approval because:  Drug not on the [facility] refill protocol

## 2021-05-25 RX ORDER — DEXTROAMPHETAMINE SACCHARATE, AMPHETAMINE ASPARTATE, DEXTROAMPHETAMINE SULFATE AND AMPHETAMINE SULFATE 7.5; 7.5; 7.5; 7.5 MG/1; MG/1; MG/1; MG/1
TABLET ORAL
Qty: 60 TABLET | Refills: 0 | Status: SHIPPED | OUTPATIENT
Start: 2021-05-25 | End: 2021-06-29

## 2021-06-28 DIAGNOSIS — Z79.899 CONTROLLED SUBSTANCE AGREEMENT SIGNED: ICD-10-CM

## 2021-06-28 DIAGNOSIS — F98.8 ATTENTION DEFICIT DISORDER, UNSPECIFIED HYPERACTIVITY PRESENCE: ICD-10-CM

## 2021-06-28 NOTE — TELEPHONE ENCOUNTER
Routing refill request to provider for review/approval because:  Drug not on the [facility] refill protocol   Amberly Doe RN, BSN

## 2021-06-29 RX ORDER — DEXTROAMPHETAMINE SACCHARATE, AMPHETAMINE ASPARTATE, DEXTROAMPHETAMINE SULFATE AND AMPHETAMINE SULFATE 7.5; 7.5; 7.5; 7.5 MG/1; MG/1; MG/1; MG/1
TABLET ORAL
Qty: 60 TABLET | Refills: 0 | Status: SHIPPED | OUTPATIENT
Start: 2021-06-29 | End: 2021-07-30

## 2021-07-30 DIAGNOSIS — Z79.899 CONTROLLED SUBSTANCE AGREEMENT SIGNED: ICD-10-CM

## 2021-07-30 DIAGNOSIS — F98.8 ATTENTION DEFICIT DISORDER, UNSPECIFIED HYPERACTIVITY PRESENCE: ICD-10-CM

## 2021-07-30 RX ORDER — DEXTROAMPHETAMINE SACCHARATE, AMPHETAMINE ASPARTATE, DEXTROAMPHETAMINE SULFATE AND AMPHETAMINE SULFATE 7.5; 7.5; 7.5; 7.5 MG/1; MG/1; MG/1; MG/1
TABLET ORAL
Qty: 60 TABLET | Refills: 0 | Status: SHIPPED | OUTPATIENT
Start: 2021-07-30 | End: 2021-09-07

## 2021-09-03 DIAGNOSIS — Z79.899 CONTROLLED SUBSTANCE AGREEMENT SIGNED: ICD-10-CM

## 2021-09-03 DIAGNOSIS — F98.8 ATTENTION DEFICIT DISORDER, UNSPECIFIED HYPERACTIVITY PRESENCE: ICD-10-CM

## 2021-09-07 RX ORDER — DEXTROAMPHETAMINE SACCHARATE, AMPHETAMINE ASPARTATE, DEXTROAMPHETAMINE SULFATE AND AMPHETAMINE SULFATE 7.5; 7.5; 7.5; 7.5 MG/1; MG/1; MG/1; MG/1
TABLET ORAL
Qty: 60 TABLET | Refills: 0 | Status: SHIPPED | OUTPATIENT
Start: 2021-09-07 | End: 2021-10-05

## 2021-10-04 DIAGNOSIS — Z79.899 CONTROLLED SUBSTANCE AGREEMENT SIGNED: ICD-10-CM

## 2021-10-04 DIAGNOSIS — F98.8 ATTENTION DEFICIT DISORDER, UNSPECIFIED HYPERACTIVITY PRESENCE: ICD-10-CM

## 2021-10-05 RX ORDER — DEXTROAMPHETAMINE SACCHARATE, AMPHETAMINE ASPARTATE, DEXTROAMPHETAMINE SULFATE AND AMPHETAMINE SULFATE 7.5; 7.5; 7.5; 7.5 MG/1; MG/1; MG/1; MG/1
TABLET ORAL
Qty: 60 TABLET | Refills: 0 | Status: SHIPPED | OUTPATIENT
Start: 2021-10-05 | End: 2021-11-05

## 2021-10-09 ENCOUNTER — HEALTH MAINTENANCE LETTER (OUTPATIENT)
Age: 31
End: 2021-10-09

## 2021-11-04 DIAGNOSIS — F98.8 ATTENTION DEFICIT DISORDER, UNSPECIFIED HYPERACTIVITY PRESENCE: ICD-10-CM

## 2021-11-04 DIAGNOSIS — Z79.899 CONTROLLED SUBSTANCE AGREEMENT SIGNED: ICD-10-CM

## 2021-11-05 RX ORDER — DEXTROAMPHETAMINE SACCHARATE, AMPHETAMINE ASPARTATE, DEXTROAMPHETAMINE SULFATE AND AMPHETAMINE SULFATE 7.5; 7.5; 7.5; 7.5 MG/1; MG/1; MG/1; MG/1
TABLET ORAL
Qty: 60 TABLET | Refills: 0 | Status: SHIPPED | OUTPATIENT
Start: 2021-11-05 | End: 2021-12-13

## 2021-11-05 NOTE — TELEPHONE ENCOUNTER
Routing refill request to provider for review/approval because:  Drug not on the [facility], P or [facility] refill protocol or controlled substance    Denisa F - Registered Nurse  [facility]  Acute and Diagnostic Services

## 2021-12-13 DIAGNOSIS — Z79.899 CONTROLLED SUBSTANCE AGREEMENT SIGNED: ICD-10-CM

## 2021-12-13 DIAGNOSIS — F98.8 ATTENTION DEFICIT DISORDER, UNSPECIFIED HYPERACTIVITY PRESENCE: ICD-10-CM

## 2021-12-13 RX ORDER — DEXTROAMPHETAMINE SACCHARATE, AMPHETAMINE ASPARTATE, DEXTROAMPHETAMINE SULFATE AND AMPHETAMINE SULFATE 7.5; 7.5; 7.5; 7.5 MG/1; MG/1; MG/1; MG/1
TABLET ORAL
Qty: 60 TABLET | Refills: 0 | Status: SHIPPED | OUTPATIENT
Start: 2021-12-13 | End: 2022-01-19

## 2021-12-13 NOTE — TELEPHONE ENCOUNTER
Routing refill request to provider for review/approval because:  Drug not on the [facility] refill protocol     Pending Prescriptions:                       Disp   Refills    amphetamine-dextroamphetamine (ADDERALL) 3*60 tab*0        Sig: TAKE ONE TABLET BY MOUTH TWICE A DAY

## 2021-12-15 NOTE — TELEPHONE ENCOUNTER
left message for pt to call back. DORENE ANSARI LPN  Please relay message below and send CSA form to be signed.DORENE ANSARI LPN

## 2022-01-18 DIAGNOSIS — Z79.899 CONTROLLED SUBSTANCE AGREEMENT SIGNED: ICD-10-CM

## 2022-01-18 DIAGNOSIS — F98.8 ATTENTION DEFICIT DISORDER, UNSPECIFIED HYPERACTIVITY PRESENCE: ICD-10-CM

## 2022-01-19 RX ORDER — DEXTROAMPHETAMINE SACCHARATE, AMPHETAMINE ASPARTATE, DEXTROAMPHETAMINE SULFATE AND AMPHETAMINE SULFATE 7.5; 7.5; 7.5; 7.5 MG/1; MG/1; MG/1; MG/1
TABLET ORAL
Qty: 60 TABLET | Refills: 0 | Status: SHIPPED | OUTPATIENT
Start: 2022-01-19 | End: 2022-02-22

## 2022-02-21 DIAGNOSIS — Z79.899 CONTROLLED SUBSTANCE AGREEMENT SIGNED: ICD-10-CM

## 2022-02-21 DIAGNOSIS — F98.8 ATTENTION DEFICIT DISORDER, UNSPECIFIED HYPERACTIVITY PRESENCE: ICD-10-CM

## 2022-02-22 RX ORDER — DEXTROAMPHETAMINE SACCHARATE, AMPHETAMINE ASPARTATE, DEXTROAMPHETAMINE SULFATE AND AMPHETAMINE SULFATE 7.5; 7.5; 7.5; 7.5 MG/1; MG/1; MG/1; MG/1
TABLET ORAL
Qty: 60 TABLET | Refills: 0 | Status: SHIPPED | OUTPATIENT
Start: 2022-02-22 | End: 2022-03-16

## 2022-02-22 NOTE — TELEPHONE ENCOUNTER
Needs appointment for ongoing refills -can be virtual     Is to be seen every 6 months for this medication     Last visit was 2/2021  Has fasting lab and urine tox ordered   If he does not do this and make appointment will not continue to refill medication   Refilled x 1 today

## 2022-02-24 ENCOUNTER — VIRTUAL VISIT (OUTPATIENT)
Dept: INTERNAL MEDICINE | Facility: CLINIC | Age: 32
End: 2022-02-24
Payer: COMMERCIAL

## 2022-02-24 VITALS — BODY MASS INDEX: 23.09 KG/M2 | WEIGHT: 175 LBS

## 2022-02-24 DIAGNOSIS — Z53.9 ERRONEOUS ENCOUNTER--DISREGARD: Primary | ICD-10-CM

## 2022-02-24 ASSESSMENT — ANXIETY QUESTIONNAIRES
5. BEING SO RESTLESS THAT IT IS HARD TO SIT STILL: NOT AT ALL
IF YOU CHECKED OFF ANY PROBLEMS ON THIS QUESTIONNAIRE, HOW DIFFICULT HAVE THESE PROBLEMS MADE IT FOR YOU TO DO YOUR WORK, TAKE CARE OF THINGS AT HOME, OR GET ALONG WITH OTHER PEOPLE: SOMEWHAT DIFFICULT
2. NOT BEING ABLE TO STOP OR CONTROL WORRYING: SEVERAL DAYS
7. FEELING AFRAID AS IF SOMETHING AWFUL MIGHT HAPPEN: SEVERAL DAYS
3. WORRYING TOO MUCH ABOUT DIFFERENT THINGS: SEVERAL DAYS
6. BECOMING EASILY ANNOYED OR IRRITABLE: SEVERAL DAYS
GAD7 TOTAL SCORE: 6
1. FEELING NERVOUS, ANXIOUS, OR ON EDGE: SEVERAL DAYS

## 2022-02-24 ASSESSMENT — PATIENT HEALTH QUESTIONNAIRE - PHQ9: 5. POOR APPETITE OR OVEREATING: SEVERAL DAYS

## 2022-02-24 NOTE — PROGRESS NOTES
Wander is a 31 year old who is being evaluated via a billable video visit.      How would you like to obtain your AVS? [employer]  If the video visit is dropped, the invitation should be resent by: Text to cell phone: [phone]  Will anyone else be joining your video visit? No      Video Start Time: 6:02 PM  No answer   Asked to reschedule

## 2022-02-24 NOTE — NURSING NOTE
"Chief Complaint   Patient presents with     Recheck Medication     initial Wt 79.4 kg (175 lb)   BMI 23.09 kg/m   Estimated body mass index is 23.09 kg/m  as calculated from the following:    Height as of 2/3/21: 1.854 m (6' 1\").    Weight as of this encounter: 79.4 kg (175 lb)..  bp completed using cuff size NA (Not Taken)  DORENE ANSARI LPN  "

## 2022-02-25 ASSESSMENT — ANXIETY QUESTIONNAIRES: GAD7 TOTAL SCORE: 6

## 2022-03-16 ENCOUNTER — VIRTUAL VISIT (OUTPATIENT)
Dept: INTERNAL MEDICINE | Facility: CLINIC | Age: 32
End: 2022-03-16
Payer: COMMERCIAL

## 2022-03-16 VITALS — BODY MASS INDEX: 23.09 KG/M2 | WEIGHT: 175 LBS

## 2022-03-16 DIAGNOSIS — F98.8 ATTENTION DEFICIT DISORDER, UNSPECIFIED HYPERACTIVITY PRESENCE: ICD-10-CM

## 2022-03-16 DIAGNOSIS — Z11.59 NEED FOR HEPATITIS C SCREENING TEST: ICD-10-CM

## 2022-03-16 DIAGNOSIS — F41.9 ANXIETY: ICD-10-CM

## 2022-03-16 DIAGNOSIS — F33.0 MILD EPISODE OF RECURRENT MAJOR DEPRESSIVE DISORDER (H): Primary | ICD-10-CM

## 2022-03-16 DIAGNOSIS — L70.0 ACNE VULGARIS: ICD-10-CM

## 2022-03-16 DIAGNOSIS — Z11.4 SCREENING FOR HIV (HUMAN IMMUNODEFICIENCY VIRUS): ICD-10-CM

## 2022-03-16 DIAGNOSIS — Z79.899 CONTROLLED SUBSTANCE AGREEMENT SIGNED: ICD-10-CM

## 2022-03-16 PROCEDURE — 99214 OFFICE O/P EST MOD 30 MIN: CPT | Mod: 95 | Performed by: NURSE PRACTITIONER

## 2022-03-16 RX ORDER — DEXTROAMPHETAMINE SACCHARATE, AMPHETAMINE ASPARTATE, DEXTROAMPHETAMINE SULFATE AND AMPHETAMINE SULFATE 7.5; 7.5; 7.5; 7.5 MG/1; MG/1; MG/1; MG/1
30 TABLET ORAL 2 TIMES DAILY
Qty: 60 TABLET | Refills: 0 | Status: SHIPPED | OUTPATIENT
Start: 2022-03-22 | End: 2022-04-28

## 2022-03-16 RX ORDER — SERTRALINE HYDROCHLORIDE 100 MG/1
100 TABLET, FILM COATED ORAL DAILY
Qty: 90 TABLET | Refills: 3 | Status: SHIPPED | OUTPATIENT
Start: 2022-03-16 | End: 2023-01-25

## 2022-03-16 RX ORDER — ADAPALENE 0.1 G/100G
CREAM TOPICAL AT BEDTIME
Qty: 45 G | Refills: 11 | Status: SHIPPED | OUTPATIENT
Start: 2022-03-16 | End: 2023-01-25

## 2022-03-16 RX ORDER — CLINDAMYCIN PHOSPHATE 10 UG/ML
LOTION TOPICAL 2 TIMES DAILY
Qty: 60 ML | Refills: 11 | Status: SHIPPED | OUTPATIENT
Start: 2022-03-16 | End: 2023-01-25

## 2022-03-16 NOTE — LETTER
[facility] [place]  03/16/22  Patient: Wander Dinero  YOB: 1990  Medical Record Number: [id number]                                                                                  Non-Opioid Controlled Substance Agreement    This is an agreement between you and your provider regarding safe and appropriate use of controlled substances prescribed by your care team. Controlled substances are?medicines that can cause physical and mental dependence (abuse).     There are strict laws about having and using these medicines. We here at [facility] are  committed to working with you in your efforts to get better. To support you in this work, we'll help you schedule regular office appointments for medicine refills. If we must cancel or change your appointment for any reason, we'll make sure you have enough medicine to last until your next appointment.     As a Provider, I will:     Listen carefully to your concerns while treating you with respect.     Recommend a treatment plan that I believe is in your best interest and may involve therapies other than medicine.      Talk with you often about the possible benefits and the risk of harm of any medicine that we prescribe for you.    Assess the safety of this medicine and check how well it works.      Provide a plan on how to taper (discontinue or go off) using this medicine if the decision is made to stop its use.      ::  As a Patient, I understand controlled substances:       Are prescribed by my care provider to help me function or work and manage my condition(s).?    Are strong medicines and can cause serious side effects.       Need to be taken exactly as prescribed.?Combining controlled substances with certain medicines or chemicals (such as illegal drugs, alcohol, sedatives, sleeping pills, and benzodiazepines) can be dangerous or even fatal.? If I stop taking my medicines suddenly, I may have severe withdrawal symptoms.     The risks,  benefits, and side effects of these medicine(s) were explained to me. I agree that:    1. I will take part in other treatments as advised by my care team. This may be psychiatry or counseling, physical therapy, behavioral therapy, group treatment or a referral to specialist.    2. I will keep all my appointments and understand this is part of the monitoring of controlled substances.?My care team may require an office visit for EVERY controlled substance refill. If I miss appointments or don t follow instructions, my care team may stop my medicine    3. I will take my medicines as prescribed. I will not change the dose or schedule unless my care team tells me to. There will be no refills if I run out early.      4. I may be asked to come to the clinic and complete a urine drug test or complete a pill count. If I don t give a urine sample or participate in a pill count, the care team may stop my medicine.    5. I will only receive controlled substance prescriptions from this clinic. If I am treated by another provider, I will tell them that I am taking controlled substances and that I have a treatment agreement with this provider. I will inform my [facility] care team within one business day if I am given a prescription for any controlled substance by another healthcare provider. My [facility] care team can contact other providers and pharmacists about my use of any medicines.    6. It is up to me to make sure that I don't run out of my medicines on weekends or holidays.?If my care team is willing to refill my prescription without a visit, I must request refills only during office hours. Refills may take up to 3 business days to process. I will use one pharmacy to fill all my controlled substance prescriptions. I will notify the clinic about any changes to my insurance or medicine availability.    7. I am responsible for my prescriptions. If the medicine/prescription is lost, stolen or destroyed, it will  not be replaced.?I also agree not to share controlled substance medicines with anyone.     8. I am aware I should not use any illegal or recreational drugs. I agree not to drink alcohol unless my care team says I can.     9. If I enroll in the [place] Medical Cannabis program, I will tell my care team before my next refill.    10. I will tell my care team right away if I become pregnant, have a new medical problem treated outside of my regular clinic, or have a change in my medicines.     11. I understand that this medicine can affect my thinking, judgment and reaction time.? Alcohol and drugs affect the brain and body, which can affect the safety of my driving. Being under the influence of alcohol or drugs can affect my decision-making, behaviors, personal safety and the safety of others. Driving while impaired (DWI) can occur if a person is driving, operating or in physical control of a car, motorcycle, boat, snowmobile, ATV, motorbike, off-road vehicle or any other motor vehicle ([place] Statute 169[place].20). I understand the risk if I choose to drive or operate any vehicle or machinery.    I understand that if I do not follow any of the conditions above, my prescriptions or treatment may be stopped or changed.   I agree that my provider, clinic care team and pharmacy may work with any city, state or federal law enforcement agency that investigates the misuse, sale or other diversion of my controlled medicine. I will allow my provider to discuss my care with, or share a copy of, this agreement with any other treating provider, pharmacy or emergency room where I receive care.     I have read this agreement and have asked questions about anything I did not understand.    ________________________________________________________  Patient Signature - Wander Dinero     ___________________                   Date     ________________________________________________________  Provider Signature - TUYET Welch CNP        ___________________                   Date     ________________________________________________________  Witness Signature (required if provider not present while patient signing)          ___________________                   Date

## 2022-03-16 NOTE — PROGRESS NOTES
Nate is a 31 year old who is being evaluated via a billable video visit.      How would you like to obtain your AVS? Emily  If the video visit is dropped, the invitation should be resent by: emily  Will anyone else be joining your video visit? No    Video Start Time: 11:11 AM    Assessment & Plan     Mild episode of recurrent major depressive disorder (H)  needs improvement - increase to 100 mg daily dose   - sertraline (ZOLOFT) 100 MG tablet; Take 1 tablet (100 mg) by mouth daily    Anxiety  Needs improvement   Increase dose   - sertraline (ZOLOFT) 100 MG tablet; Take 1 tablet (100 mg) by mouth daily    Attention deficit disorder, unspecified hyperactivity presence  Doing well on current dose ADD medication   Will sign CSA   Will do lab in next week   - sertraline (ZOLOFT) 100 MG tablet; Take 1 tablet (100 mg) by mouth daily  - amphetamine-dextroamphetamine (ADDERALL) 30 MG tablet; Take 1 tablet (30 mg) by mouth 2 times daily TAKE ONE TABLET BY MOUTH TWICE A DAY    Screening for HIV (human immunodeficiency virus)      Need for hepatitis C screening test      Controlled substance agreement signed  Sent to patient emily and will mail for him to sign   - amphetamine-dextroamphetamine (ADDERALL) 30 MG tablet; Take 1 tablet (30 mg) by mouth 2 times daily TAKE ONE TABLET BY MOUTH TWICE A DAY    Acne vulgaris  Prn refill   - adapalene (DIFFERIN) 0.1 % external cream; Apply topically At Bedtime ### DO NOT FILL NOW.  Please update patient's profile to reflect additional refills.  ####  - clindamycin (CLEOCIN T) 1 % external lotion; Apply topically 2 times daily ### DO NOT FILL NOW.  Please update patient's profile to reflect additional refills.  ####      12 minutes spent on the date of the encounter doing chart review, history and exam, documentation and further activities per the note       Patient Instructions   Increase Zoloft to 100 mg daily     Adderall refill 3/22/2022      Return in about 6 months (around  9/16/2022) for adderall.    TUYET Welch CNP  [facility] [place]    Domonique Sullivan is a 31 year old who presents for the following health issues     History of Present Illness       Reason for visit:  General meeting to discuss medication    He eats 2-3 servings of fruits and vegetables daily.He consumes 1 sweetened beverage(s) daily.He exercises with enough effort to increase his heart rate 30 to 60 minutes per day.  He exercises with enough effort to increase his heart rate 5 days per week.   He is taking medications regularly.     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      Objective           Vitals:  No vitals were obtained today due to virtual visit.    Physical Exam   GENERAL: Healthy, alert and no distress  EYES: Eyes grossly normal to inspection.  No discharge or erythema, or obvious scleral/conjunctival abnormalities.  RESP: No audible wheeze, cough, or visible cyanosis.  No visible retractions or increased work of breathing.    SKIN: Visible skin clear. No significant rash, abnormal pigmentation or lesions.  NEURO: Cranial nerves grossly intact.  Mentation and speech appropriate for age.  PSYCH: Mentation appears normal, affect normal/bright, judgement and insight intact, normal speech and appearance well-groomed.    Lab future             Video-Visit Details    Type of service:  Video Visit    Video End Time:11:20 AM    Originating Location (pt. Location): Home    Distant Location (provider location):  [facility]     Platform used for Video Visit: [employer]

## 2022-04-26 DIAGNOSIS — Z79.899 CONTROLLED SUBSTANCE AGREEMENT SIGNED: ICD-10-CM

## 2022-04-26 DIAGNOSIS — F98.8 ATTENTION DEFICIT DISORDER, UNSPECIFIED HYPERACTIVITY PRESENCE: ICD-10-CM

## 2022-04-28 RX ORDER — DEXTROAMPHETAMINE SACCHARATE, AMPHETAMINE ASPARTATE, DEXTROAMPHETAMINE SULFATE AND AMPHETAMINE SULFATE 7.5; 7.5; 7.5; 7.5 MG/1; MG/1; MG/1; MG/1
TABLET ORAL
Qty: 60 TABLET | Refills: 0 | Status: SHIPPED | OUTPATIENT
Start: 2022-04-28 | End: 2022-06-07

## 2022-05-16 ENCOUNTER — HEALTH MAINTENANCE LETTER (OUTPATIENT)
Age: 32
End: 2022-05-16

## 2022-06-06 DIAGNOSIS — Z79.899 CONTROLLED SUBSTANCE AGREEMENT SIGNED: ICD-10-CM

## 2022-06-06 DIAGNOSIS — F98.8 ATTENTION DEFICIT DISORDER, UNSPECIFIED HYPERACTIVITY PRESENCE: ICD-10-CM

## 2022-06-07 RX ORDER — DEXTROAMPHETAMINE SACCHARATE, AMPHETAMINE ASPARTATE, DEXTROAMPHETAMINE SULFATE AND AMPHETAMINE SULFATE 7.5; 7.5; 7.5; 7.5 MG/1; MG/1; MG/1; MG/1
TABLET ORAL
Qty: 60 TABLET | Refills: 0 | Status: SHIPPED | OUTPATIENT
Start: 2022-06-07 | End: 2022-07-19

## 2022-06-07 NOTE — TELEPHONE ENCOUNTER
Adderall  Last Written Prescription Date:  4/28/2022  Last Fill Quantity: 60,  # refills: 0   Last office visit: 2/3/2021 with prescribing provider:  Future Office Visit:      Routing refill request to provider for review/approval because:  Drug not on the [facility], P or [facility] refill protocol or controlled substance    Denisa F - Registered Nurse  [facility]  Acute and Diagnostic Services

## 2022-07-18 DIAGNOSIS — Z79.899 CONTROLLED SUBSTANCE AGREEMENT SIGNED: ICD-10-CM

## 2022-07-18 DIAGNOSIS — F98.8 ATTENTION DEFICIT DISORDER, UNSPECIFIED HYPERACTIVITY PRESENCE: ICD-10-CM

## 2022-07-19 RX ORDER — DEXTROAMPHETAMINE SACCHARATE, AMPHETAMINE ASPARTATE, DEXTROAMPHETAMINE SULFATE AND AMPHETAMINE SULFATE 7.5; 7.5; 7.5; 7.5 MG/1; MG/1; MG/1; MG/1
TABLET ORAL
Qty: 60 TABLET | Refills: 0 | Status: SHIPPED | OUTPATIENT
Start: 2022-07-19 | End: 2022-08-24

## 2022-09-11 ENCOUNTER — HEALTH MAINTENANCE LETTER (OUTPATIENT)
Age: 32
End: 2022-09-11

## 2022-09-27 DIAGNOSIS — Z79.899 CONTROLLED SUBSTANCE AGREEMENT SIGNED: ICD-10-CM

## 2022-09-27 DIAGNOSIS — F98.8 ATTENTION DEFICIT DISORDER, UNSPECIFIED HYPERACTIVITY PRESENCE: ICD-10-CM

## 2022-09-27 RX ORDER — DEXTROAMPHETAMINE SACCHARATE, AMPHETAMINE ASPARTATE, DEXTROAMPHETAMINE SULFATE AND AMPHETAMINE SULFATE 7.5; 7.5; 7.5; 7.5 MG/1; MG/1; MG/1; MG/1
TABLET ORAL
Qty: 60 TABLET | Refills: 0 | Status: SHIPPED | OUTPATIENT
Start: 2022-09-27 | End: 2022-10-25

## 2022-10-25 DIAGNOSIS — Z79.899 CONTROLLED SUBSTANCE AGREEMENT SIGNED: ICD-10-CM

## 2022-10-25 DIAGNOSIS — F98.8 ATTENTION DEFICIT DISORDER, UNSPECIFIED HYPERACTIVITY PRESENCE: ICD-10-CM

## 2022-10-25 RX ORDER — DEXTROAMPHETAMINE SACCHARATE, AMPHETAMINE ASPARTATE, DEXTROAMPHETAMINE SULFATE AND AMPHETAMINE SULFATE 7.5; 7.5; 7.5; 7.5 MG/1; MG/1; MG/1; MG/1
TABLET ORAL
Qty: 60 TABLET | Refills: 0 | Status: SHIPPED | OUTPATIENT
Start: 2022-10-25 | End: 2022-11-30

## 2022-11-30 DIAGNOSIS — F98.8 ATTENTION DEFICIT DISORDER, UNSPECIFIED HYPERACTIVITY PRESENCE: ICD-10-CM

## 2022-11-30 DIAGNOSIS — Z79.899 CONTROLLED SUBSTANCE AGREEMENT SIGNED: ICD-10-CM

## 2022-12-01 RX ORDER — DEXTROAMPHETAMINE SACCHARATE, AMPHETAMINE ASPARTATE, DEXTROAMPHETAMINE SULFATE AND AMPHETAMINE SULFATE 7.5; 7.5; 7.5; 7.5 MG/1; MG/1; MG/1; MG/1
30 TABLET ORAL 2 TIMES DAILY
Qty: 60 TABLET | Refills: 0 | Status: SHIPPED | OUTPATIENT
Start: 2022-12-01 | End: 2023-01-19

## 2023-01-18 DIAGNOSIS — Z79.899 CONTROLLED SUBSTANCE AGREEMENT SIGNED: ICD-10-CM

## 2023-01-18 DIAGNOSIS — F98.8 ATTENTION DEFICIT DISORDER, UNSPECIFIED HYPERACTIVITY PRESENCE: ICD-10-CM

## 2023-01-19 RX ORDER — DEXTROAMPHETAMINE SACCHARATE, AMPHETAMINE ASPARTATE, DEXTROAMPHETAMINE SULFATE AND AMPHETAMINE SULFATE 7.5; 7.5; 7.5; 7.5 MG/1; MG/1; MG/1; MG/1
30 TABLET ORAL 2 TIMES DAILY
Qty: 60 TABLET | Refills: 0 | Status: SHIPPED | OUTPATIENT
Start: 2023-01-19 | End: 2023-03-07

## 2023-01-25 ENCOUNTER — VIRTUAL VISIT (OUTPATIENT)
Dept: INTERNAL MEDICINE | Facility: CLINIC | Age: 33
End: 2023-01-25
Payer: COMMERCIAL

## 2023-01-25 DIAGNOSIS — F33.0 MILD EPISODE OF RECURRENT MAJOR DEPRESSIVE DISORDER (H): ICD-10-CM

## 2023-01-25 DIAGNOSIS — L70.0 ACNE VULGARIS: ICD-10-CM

## 2023-01-25 DIAGNOSIS — Z00.00 LABORATORY EXAMINATION ORDERED AS PART OF A ROUTINE GENERAL MEDICAL EXAMINATION: Primary | ICD-10-CM

## 2023-01-25 DIAGNOSIS — F41.9 ANXIETY: ICD-10-CM

## 2023-01-25 DIAGNOSIS — F98.8 ATTENTION DEFICIT DISORDER, UNSPECIFIED HYPERACTIVITY PRESENCE: ICD-10-CM

## 2023-01-25 DIAGNOSIS — E55.9 VITAMIN D DEFICIENCY: ICD-10-CM

## 2023-01-25 PROCEDURE — 99214 OFFICE O/P EST MOD 30 MIN: CPT | Mod: 95 | Performed by: NURSE PRACTITIONER

## 2023-01-25 RX ORDER — SERTRALINE HYDROCHLORIDE 100 MG/1
100 TABLET, FILM COATED ORAL DAILY
Qty: 90 TABLET | Refills: 3 | Status: SHIPPED | OUTPATIENT
Start: 2023-01-25 | End: 2024-04-16

## 2023-01-25 RX ORDER — CLINDAMYCIN PHOSPHATE 10 UG/ML
LOTION TOPICAL 2 TIMES DAILY
Qty: 60 ML | Refills: 11 | Status: SHIPPED | OUTPATIENT
Start: 2023-01-25 | End: 2024-04-16

## 2023-01-25 RX ORDER — ADAPALENE 0.1 G/100G
CREAM TOPICAL AT BEDTIME
Qty: 45 G | Refills: 11 | Status: SHIPPED | OUTPATIENT
Start: 2023-01-25 | End: 2024-04-16

## 2023-01-25 ASSESSMENT — ANXIETY QUESTIONNAIRES
7. FEELING AFRAID AS IF SOMETHING AWFUL MIGHT HAPPEN: NOT AT ALL
IF YOU CHECKED OFF ANY PROBLEMS ON THIS QUESTIONNAIRE, HOW DIFFICULT HAVE THESE PROBLEMS MADE IT FOR YOU TO DO YOUR WORK, TAKE CARE OF THINGS AT HOME, OR GET ALONG WITH OTHER PEOPLE: SOMEWHAT DIFFICULT
1. FEELING NERVOUS, ANXIOUS, OR ON EDGE: NOT AT ALL
5. BEING SO RESTLESS THAT IT IS HARD TO SIT STILL: NOT AT ALL
GAD7 TOTAL SCORE: 2
6. BECOMING EASILY ANNOYED OR IRRITABLE: SEVERAL DAYS
GAD7 TOTAL SCORE: 2
2. NOT BEING ABLE TO STOP OR CONTROL WORRYING: NOT AT ALL
3. WORRYING TOO MUCH ABOUT DIFFERENT THINGS: NOT AT ALL

## 2023-01-25 ASSESSMENT — PATIENT HEALTH QUESTIONNAIRE - PHQ9
SUM OF ALL RESPONSES TO PHQ QUESTIONS 1-9: 4
5. POOR APPETITE OR OVEREATING: SEVERAL DAYS

## 2023-01-25 NOTE — PATIENT INSTRUCTIONS
Lab fasting when you can   You need to be fasting for 12hrs. You can have water and any meds you are on. But, no food, coffee, tea or diet pop.    Medication refills available    We will send the controlled substance agreement to you to sign

## 2023-01-25 NOTE — LETTER
[facility]  01/25/23  Patient: Wander Dinero  YOB: 1990  Medical Record Number: [id number]                                                                                  Non-Opioid Controlled Substance Agreement    This is an agreement between you and your provider regarding safe and appropriate use of controlled substances prescribed by your care team. Controlled substances are?medicines that can cause physical and mental dependence (abuse).     There are strict laws about having and using these medicines. We here at [facility] are  committed to working with you in your efforts to get better. To support you in this work, we'll help you schedule regular office appointments for medicine refills. If we must cancel or change your appointment for any reason, we'll make sure you have enough medicine to last until your next appointment.     As a Provider, I will:     Listen carefully to your concerns while treating you with respect.     Recommend a treatment plan that I believe is in your best interest and may involve therapies other than medicine.      Talk with you often about the possible benefits and the risk of harm of any medicine that we prescribe for you.    Assess the safety of this medicine and check how well it works.      Provide a plan on how to taper (discontinue or go off) using this medicine if the decision is made to stop its use.      ::  As a Patient, I understand controlled substances:       Are prescribed by my care provider to help me function or work and manage my condition(s).?    Are strong medicines and can cause serious side effects.       Need to be taken exactly as prescribed.?Combining controlled substances with certain medicines or chemicals (such as illegal drugs, alcohol, sedatives, sleeping pills, and benzodiazepines) can be dangerous or even fatal.? If I stop taking my medicines suddenly, I may have severe withdrawal symptoms.     The risks,  benefits, and side effects of these medicine(s) were explained to me. I agree that:    1. I will take part in other treatments as advised by my care team. This may be psychiatry or counseling, physical therapy, behavioral therapy, group treatment or a referral to specialist.    2. I will keep all my appointments and understand this is part of the monitoring of controlled substances.?My care team may require an office visit for EVERY controlled substance refill. If I miss appointments or don t follow instructions, my care team may stop my medicine    3. I will take my medicines as prescribed. I will not change the dose or schedule unless my care team tells me to. There will be no refills if I run out early.      4. I may be asked to come to the clinic and complete a urine drug test or complete a pill count. If I don t give a urine sample or participate in a pill count, the care team may stop my medicine.    5. I will only receive controlled substance prescriptions from this clinic. If I am treated by another provider, I will tell them that I am taking controlled substances and that I have a treatment agreement with this provider. I will inform my [facility] care team within one business day if I am given a prescription for any controlled substance by another healthcare provider. My [facility] care team can contact other providers and pharmacists about my use of any medicines.    6. It is up to me to make sure that I don't run out of my medicines on weekends or holidays.?If my care team is willing to refill my prescription without a visit, I must request refills only during office hours. Refills may take up to 3 business days to process. I will use one pharmacy to fill all my controlled substance prescriptions. I will notify the clinic about any changes to my insurance or medicine availability.    7. I am responsible for my prescriptions. If the medicine/prescription is lost, stolen or destroyed, it will  not be replaced.?I also agree not to share controlled substance medicines with anyone.     8. I am aware I should not use any illegal or recreational drugs. I agree not to drink alcohol unless my care team says I can.     9. If I enroll in the [place] Medical Cannabis program, I will tell my care team before my next refill.    10. I will tell my care team right away if I become pregnant, have a new medical problem treated outside of my regular clinic, or have a change in my medicines.     11. I understand that this medicine can affect my thinking, judgment and reaction time.? Alcohol and drugs affect the brain and body, which can affect the safety of my driving. Being under the influence of alcohol or drugs can affect my decision-making, behaviors, personal safety and the safety of others. Driving while impaired (DWI) can occur if a person is driving, operating or in physical control of a car, motorcycle, boat, snowmobile, ATV, motorbike, off-road vehicle or any other motor vehicle ([place] Statute 169A.20). I understand the risk if I choose to drive or operate any vehicle or machinery.    I understand that if I do not follow any of the conditions above, my prescriptions or treatment may be stopped or changed.   I agree that my provider, clinic care team and pharmacy may work with any city, state or federal law enforcement agency that investigates the misuse, sale or other diversion of my controlled medicine. I will allow my provider to discuss my care with, or share a copy of, this agreement with any other treating provider, pharmacy or emergency room where I receive care.     I have read this agreement and have asked questions about anything I did not understand.    ________________________________________________________  Patient Signature - Wander Dinero     ___________________                   Date     ________________________________________________________  Provider Signature - TUYET Welch CNP        ___________________                   Date     ________________________________________________________  Witness Signature (required if provider not present while patient signing)          ___________________                   Date

## 2023-01-25 NOTE — NURSING NOTE
"Chief Complaint   Patient presents with     Recheck Medication     Refills       initial There were no vitals taken for this visit. Estimated body mass index is 23.09 kg/m  as calculated from the following:    Height as of 2/3/21: 1.854 m (6' 1\").    Weight as of 3/16/22: 79.4 kg (175 lb)..  bp completed using cuff size NA (Not Taken)  DORENE ANSARI LPN  "

## 2023-01-25 NOTE — PROGRESS NOTES
Nate is a 32 year old who is being evaluated via a billable video visit.      How would you like to obtain your AVS? Emily  If the video visit is dropped, the invitation should be resent by: emily  Will anyone else be joining your video visit? No        Assessment & Plan     Attention deficit disorder, unspecified hyperactivity presence  Doing well on current dose of medication   Will sign CSA      - sertraline (ZOLOFT) 100 MG tablet; Take 1 tablet (100 mg) by mouth daily ### DO NOT FILL NOW.  Please update patient's profile to reflect additional refills.  ####  - Comprehensive metabolic panel (BMP + Alb, Alk Phos, ALT, AST, Total. Bili, TP); Future  - TSH with free T4 reflex; Future  - CBC with platelets and differential; Future  - Vitamin D Deficiency; Future    Mild episode of recurrent major depressive disorder (H)  Stable on medication   - sertraline (ZOLOFT) 100 MG tablet; Take 1 tablet (100 mg) by mouth daily ### DO NOT FILL NOW.  Please update patient's profile to reflect additional refills.  ####  - Comprehensive metabolic panel (BMP + Alb, Alk Phos, ALT, AST, Total. Bili, TP); Future  - TSH with free T4 reflex; Future  - CBC with platelets and differential; Future  - Vitamin D Deficiency; Future    Anxiety  Stable on medication   - sertraline (ZOLOFT) 100 MG tablet; Take 1 tablet (100 mg) by mouth daily ### DO NOT FILL NOW.  Please update patient's profile to reflect additional refills.  ####  - Comprehensive metabolic panel (BMP + Alb, Alk Phos, ALT, AST, Total. Bili, TP); Future  - TSH with free T4 reflex; Future  - CBC with platelets and differential; Future  - Vitamin D Deficiency; Future    Acne vulgaris  Uses as needed   - clindamycin (CLEOCIN T) 1 % external lotion; Apply topically 2 times daily ### DO NOT FILL NOW.  Please update patient's profile to reflect additional refills.  ####  - adapalene (DIFFERIN) 0.1 % external cream; Apply topically At Bedtime ### DO NOT FILL NOW.  Please update  patient's profile to reflect additional refills.  ####    Laboratory examination ordered as part of a routine general medical examination    - Lipid panel reflex to direct LDL Fasting; Future  - Comprehensive metabolic panel (BMP + Alb, Alk Phos, ALT, AST, Total. Bili, TP); Future  - TSH with free T4 reflex; Future  - CBC with platelets and differential; Future    Vitamin D deficiency    - Vitamin D Deficiency; Future      12 minutes spent on the date of the encounter doing chart review, history and exam, documentation and further activities per the note       Patient Instructions   Lab fasting when you can   You need to be fasting for 12hrs. You can have water and any meds you are on. But, no food, coffee, tea or diet pop.    Medication refills available    We will send the controlled substance agreement to you to sign            Return in about 6 months (around 7/25/2023) for adderall .    TUYET Welch CNP  [facility] [place] MARY Sullivan is a 32 year old, presenting for the following health issues:      HPI     adderall 30 mg twice daily   Working well for him at current    zoloft 100 mg daily     Working well for him     PHQ 2/3/2021 3/16/2022 1/25/2023   PHQ-9 Total Score 8 10 4   Q9: Thoughts of better off dead/self-harm past 2 weeks Not at all Not at all Not at all     LUIS MANUEL-7 SCORE 2/24/2022 3/16/2022 1/25/2023   Total Score - 7 (mild anxiety) -   Total Score 6 7 2     Skin medication - refills needed for next year     Due for fasting lab   Agreeable to do         Review of Systems   Constitutional, HEENT, cardiovascular, pulmonary, GI, , musculoskeletal, neuro, skin, endocrine and psych systems are negative, except as otherwise noted.      Objective           Vitals:  No vitals were obtained today due to virtual visit.    Physical Exam   GENERAL: Healthy, alert and no distress  EYES: Eyes grossly normal to inspection.  No discharge or erythema, or obvious  scleral/conjunctival abnormalities.  RESP: No audible wheeze, cough, or visible cyanosis.  No visible retractions or increased work of breathing.    SKIN: Visible skin clear. No significant rash, abnormal pigmentation or lesions.  NEURO: Cranial nerves grossly intact.  Mentation and speech appropriate for age.  PSYCH: Mentation appears normal, affect normal/bright, judgement and insight intact, normal speech and appearance well-groomed.    Lab future             Video-Visit Details    Type of service:  Video Visit     Originating Location (pt. Location): Home  Distant Location (provider location):  On-site  Platform used for Video Visit: Demetrius

## 2023-03-06 DIAGNOSIS — Z79.899 CONTROLLED SUBSTANCE AGREEMENT SIGNED: ICD-10-CM

## 2023-03-06 DIAGNOSIS — F98.8 ATTENTION DEFICIT DISORDER, UNSPECIFIED HYPERACTIVITY PRESENCE: ICD-10-CM

## 2023-03-07 RX ORDER — DEXTROAMPHETAMINE SACCHARATE, AMPHETAMINE ASPARTATE, DEXTROAMPHETAMINE SULFATE AND AMPHETAMINE SULFATE 7.5; 7.5; 7.5; 7.5 MG/1; MG/1; MG/1; MG/1
30 TABLET ORAL 2 TIMES DAILY
Qty: 60 TABLET | Refills: 0 | Status: SHIPPED | OUTPATIENT
Start: 2023-03-07 | End: 2023-04-13

## 2023-04-12 DIAGNOSIS — F98.8 ATTENTION DEFICIT DISORDER, UNSPECIFIED HYPERACTIVITY PRESENCE: ICD-10-CM

## 2023-04-12 DIAGNOSIS — Z79.899 CONTROLLED SUBSTANCE AGREEMENT SIGNED: ICD-10-CM

## 2023-04-13 RX ORDER — DEXTROAMPHETAMINE SACCHARATE, AMPHETAMINE ASPARTATE, DEXTROAMPHETAMINE SULFATE AND AMPHETAMINE SULFATE 3.75; 3.75; 3.75; 3.75 MG/1; MG/1; MG/1; MG/1
TABLET ORAL
Qty: 120 TABLET | Refills: 0 | Status: SHIPPED | OUTPATIENT
Start: 2023-04-13 | End: 2023-05-17

## 2023-05-15 DIAGNOSIS — Z79.899 CONTROLLED SUBSTANCE AGREEMENT SIGNED: ICD-10-CM

## 2023-05-15 DIAGNOSIS — F98.8 ATTENTION DEFICIT DISORDER, UNSPECIFIED HYPERACTIVITY PRESENCE: ICD-10-CM

## 2023-05-17 RX ORDER — DEXTROAMPHETAMINE SACCHARATE, AMPHETAMINE ASPARTATE, DEXTROAMPHETAMINE SULFATE AND AMPHETAMINE SULFATE 3.75; 3.75; 3.75; 3.75 MG/1; MG/1; MG/1; MG/1
TABLET ORAL
Qty: 120 TABLET | Refills: 0 | Status: SHIPPED | OUTPATIENT
Start: 2023-05-17 | End: 2023-06-20

## 2023-06-03 ENCOUNTER — HEALTH MAINTENANCE LETTER (OUTPATIENT)
Age: 33
End: 2023-06-03

## 2023-08-06 DIAGNOSIS — Z79.899 CONTROLLED SUBSTANCE AGREEMENT SIGNED: ICD-10-CM

## 2023-08-06 DIAGNOSIS — F98.8 ATTENTION DEFICIT DISORDER, UNSPECIFIED HYPERACTIVITY PRESENCE: ICD-10-CM

## 2023-08-06 NOTE — LETTER
August 14, 2023      Wander Dinero  [address] [place] [address]        Dear Nate,    We are concerned about your health care.  We recently provided you with a medication refill.  Many medications require routine follow-up with your provider.      At this time we ask that: You schedule a routine office visit with your physician to follow your adderall    Your prescription: Has been refilled for 1 month so you may have time for the above noted follow-up.      Thank you,      MARISABEL NuñezC

## 2023-08-07 RX ORDER — DEXTROAMPHETAMINE SACCHARATE, AMPHETAMINE ASPARTATE, DEXTROAMPHETAMINE SULFATE AND AMPHETAMINE SULFATE 3.75; 3.75; 3.75; 3.75 MG/1; MG/1; MG/1; MG/1
TABLET ORAL
Qty: 120 TABLET | Refills: 0 | Status: SHIPPED | OUTPATIENT
Start: 2023-08-07 | End: 2023-09-11

## 2023-08-09 NOTE — TELEPHONE ENCOUNTER
Called and left patient a voice mail message on August 9, 2023 2:55 PM to call back the clinic.    Thank you,  Diony Rea, Triage RN Lorraine Norcross  2:55 PM 8/9/2023

## 2023-08-29 ENCOUNTER — OFFICE VISIT (OUTPATIENT)
Dept: INTERNAL MEDICINE | Facility: CLINIC | Age: 33
End: 2023-08-29
Payer: COMMERCIAL

## 2023-08-29 VITALS
WEIGHT: 179.9 LBS | SYSTOLIC BLOOD PRESSURE: 123 MMHG | RESPIRATION RATE: 12 BRPM | BODY MASS INDEX: 24.37 KG/M2 | DIASTOLIC BLOOD PRESSURE: 81 MMHG | HEART RATE: 86 BPM | HEIGHT: 72 IN | TEMPERATURE: 97.9 F | OXYGEN SATURATION: 100 %

## 2023-08-29 DIAGNOSIS — Z11.4 SCREENING FOR HIV (HUMAN IMMUNODEFICIENCY VIRUS): ICD-10-CM

## 2023-08-29 DIAGNOSIS — Z00.00 ENCOUNTER FOR PREVENTATIVE ADULT HEALTH CARE EXAMINATION: Primary | ICD-10-CM

## 2023-08-29 DIAGNOSIS — R29.898 ARM WEAKNESS: ICD-10-CM

## 2023-08-29 DIAGNOSIS — Z11.59 NEED FOR HEPATITIS C SCREENING TEST: ICD-10-CM

## 2023-08-29 DIAGNOSIS — F41.9 ANXIETY: ICD-10-CM

## 2023-08-29 DIAGNOSIS — F98.8 ATTENTION DEFICIT DISORDER, UNSPECIFIED HYPERACTIVITY PRESENCE: ICD-10-CM

## 2023-08-29 LAB
ALBUMIN SERPL BCG-MCNC: 4.7 G/DL (ref 3.5–5.2)
ALP SERPL-CCNC: 47 U/L (ref 40–129)
ALT SERPL W P-5'-P-CCNC: 16 U/L (ref 0–70)
ANION GAP SERPL CALCULATED.3IONS-SCNC: 8 MMOL/L (ref 7–15)
AST SERPL W P-5'-P-CCNC: 25 U/L (ref 0–45)
BILIRUB SERPL-MCNC: 0.3 MG/DL
BUN SERPL-MCNC: 18.1 MG/DL (ref 6–20)
CALCIUM SERPL-MCNC: 9.5 MG/DL (ref 8.6–10)
CHLORIDE SERPL-SCNC: 103 MMOL/L (ref 98–107)
CHOLEST SERPL-MCNC: 220 MG/DL
CREAT SERPL-MCNC: 1.08 MG/DL (ref 0.67–1.17)
DEPRECATED HCO3 PLAS-SCNC: 29 MMOL/L (ref 22–29)
ERYTHROCYTE [DISTWIDTH] IN BLOOD BY AUTOMATED COUNT: 13.6 % (ref 10–15)
GFR SERPL CREATININE-BSD FRML MDRD: >90 ML/MIN/1.73M2
GLUCOSE SERPL-MCNC: 100 MG/DL (ref 70–99)
HCT VFR BLD AUTO: 43.2 % (ref 40–53)
HCV AB SERPL QL IA: NONREACTIVE
HDLC SERPL-MCNC: 56 MG/DL
HGB BLD-MCNC: 14.1 G/DL (ref 13.3–17.7)
HIV 1+2 AB+HIV1 P24 AG SERPL QL IA: NONREACTIVE
LDLC SERPL CALC-MCNC: 151 MG/DL
MCH RBC QN AUTO: 26.8 PG (ref 26.5–33)
MCHC RBC AUTO-ENTMCNC: 32.6 G/DL (ref 31.5–36.5)
MCV RBC AUTO: 82 FL (ref 78–100)
NONHDLC SERPL-MCNC: 164 MG/DL
PLATELET # BLD AUTO: 221 10E3/UL (ref 150–450)
POTASSIUM SERPL-SCNC: 4.8 MMOL/L (ref 3.4–5.3)
PROT SERPL-MCNC: 7.3 G/DL (ref 6.4–8.3)
RBC # BLD AUTO: 5.26 10E6/UL (ref 4.4–5.9)
SODIUM SERPL-SCNC: 140 MMOL/L (ref 136–145)
TRIGL SERPL-MCNC: 66 MG/DL
WBC # BLD AUTO: 6.9 10E3/UL (ref 4–11)

## 2023-08-29 PROCEDURE — 85027 COMPLETE CBC AUTOMATED: CPT | Performed by: INTERNAL MEDICINE

## 2023-08-29 PROCEDURE — 87389 HIV-1 AG W/HIV-1&-2 AB AG IA: CPT | Performed by: INTERNAL MEDICINE

## 2023-08-29 PROCEDURE — 80061 LIPID PANEL: CPT | Performed by: INTERNAL MEDICINE

## 2023-08-29 PROCEDURE — 80053 COMPREHEN METABOLIC PANEL: CPT | Performed by: INTERNAL MEDICINE

## 2023-08-29 PROCEDURE — 36415 COLL VENOUS BLD VENIPUNCTURE: CPT | Performed by: INTERNAL MEDICINE

## 2023-08-29 PROCEDURE — 99395 PREV VISIT EST AGE 18-39: CPT | Performed by: INTERNAL MEDICINE

## 2023-08-29 PROCEDURE — 86803 HEPATITIS C AB TEST: CPT | Performed by: INTERNAL MEDICINE

## 2023-08-29 PROCEDURE — 99213 OFFICE O/P EST LOW 20 MIN: CPT | Mod: 25 | Performed by: INTERNAL MEDICINE

## 2023-08-29 ASSESSMENT — ENCOUNTER SYMPTOMS
FEVER: 0
HEADACHES: 0
HEMATURIA: 0
WEAKNESS: 0
DIARRHEA: 0
MYALGIAS: 0
ABDOMINAL PAIN: 0
NERVOUS/ANXIOUS: 0
PALPITATIONS: 0
NAUSEA: 0
JOINT SWELLING: 0
DIZZINESS: 0
HEMATOCHEZIA: 0
HEARTBURN: 0
DYSURIA: 0
EYE PAIN: 0
CONSTIPATION: 0
COUGH: 0
FREQUENCY: 0
PARESTHESIAS: 0
CHILLS: 0
ARTHRALGIAS: 0
SHORTNESS OF BREATH: 0
SORE THROAT: 0

## 2023-08-29 ASSESSMENT — PATIENT HEALTH QUESTIONNAIRE - PHQ9
SUM OF ALL RESPONSES TO PHQ QUESTIONS 1-9: 3
SUM OF ALL RESPONSES TO PHQ QUESTIONS 1-9: 3
10. IF YOU CHECKED OFF ANY PROBLEMS, HOW DIFFICULT HAVE THESE PROBLEMS MADE IT FOR YOU TO DO YOUR WORK, TAKE CARE OF THINGS AT HOME, OR GET ALONG WITH OTHER PEOPLE: NOT DIFFICULT AT ALL

## 2023-08-29 ASSESSMENT — PAIN SCALES - GENERAL: PAINLEVEL: MILD PAIN (2)

## 2023-08-29 NOTE — PROGRESS NOTES
SUBJECTIVE:   CC: Nate is an 33 year old who presents for preventative health visit.       8/29/2023     7:53 AM   Additional Questions   Roomed by Marci BIRCH   Accompanied by n/a       Healthy Habits:     Getting at least 3 servings of Calcium per day:  Yes    Bi-annual eye exam:  NO    Dental care twice a year:  Yes    Sleep apnea or symptoms of sleep apnea:  None    Diet:  Regular (no restrictions)    Frequency of exercise:  2-3 days/week    Duration of exercise:  Greater than 60 minutes    Taking medications regularly:  Yes    Medication side effects:  None    Additional concerns today:  No      Today's PHQ-9 Score:       8/29/2023     7:50 AM   PHQ-9 SCORE   PHQ-9 Total Score MyChart 3 (Minimal depression)   PHQ-9 Total Score 3         Pt c/o pins and needles feeling from left axillary area all of the way down to his finger tips (mostly index and middle) x2 weeks; pt states it is daily and sometimes it's worse than others          Have you ever done Advance Care Planning? (For example, a Health Directive, POLST, or a discussion with a medical provider or your loved ones about your wishes): No, advance care planning information given to patient to review.  Patient plans to discuss their wishes with loved ones or provider.      Social History     Tobacco Use    Smoking status: Never    Smokeless tobacco: Never   Substance Use Topics    Alcohol use: Yes     Alcohol/week: 0.0 standard drinks of alcohol     Comment: 2 per week             8/29/2023     7:54 AM   Alcohol Use   Prescreen: >3 drinks/day or >7 drinks/week? No       Last PSA: No results found for: PSA    Reviewed orders with patient. Reviewed health maintenance and updated orders accordingly - Yes  Lab work is in process  Labs reviewed in EPIC    Reviewed and updated as needed this visit by clinical staff   Tobacco  Allergies  Meds  Problems  Med Hx  Surg Hx  Fam Hx          Reviewed and updated as needed this visit by Provider                 Has  concerns for left arm weakness and tingling with exercise. Noted 1 month ago. Symptoms resolve with rest. No injury. Noted with doing lifting exercises.   Has h/o anxiety and mild depression. On Zoloft. Controlled symptoms. Has a newly born child 3 weeks old. Some reduced sleep in this relation.   Has h/o ADHD. On Adderall. Helps with concentration and doing his work. No side effects noted.     Review of Systems   Constitutional:  Negative for chills and fever.   HENT:  Negative for congestion, ear pain, hearing loss and sore throat.    Eyes:  Negative for pain and visual disturbance.   Respiratory:  Negative for cough and shortness of breath.    Cardiovascular:  Negative for chest pain, palpitations and peripheral edema.   Gastrointestinal:  Negative for abdominal pain, constipation, diarrhea, heartburn, hematochezia and nausea.   Genitourinary:  Negative for dysuria, frequency, genital sores, hematuria, impotence, penile discharge and urgency.   Musculoskeletal:  Negative for arthralgias, joint swelling and myalgias.   Skin:  Negative for rash.   Neurological:  Negative for dizziness, weakness, headaches and paresthesias.   Psychiatric/Behavioral:  Negative for mood changes. The patient is not nervous/anxious.        OBJECTIVE:   /81 (BP Location: Left arm, Cuff Size: Adult Regular)   Pulse 86   Temp 97.9  F (36.6  C) (Tympanic)   Resp 12   Ht 1.829 m (6')   Wt 81.6 kg (179 lb 14.4 oz)   SpO2 100%   BMI 24.40 kg/m      Physical Exam  GENERAL: healthy, alert and no distress  EYES: Eyes grossly normal to inspection, PERRL and conjunctivae and sclerae normal  HENT: ear canals and TM's normal, nose and mouth without ulcers or lesions  NECK: no adenopathy, no asymmetry, masses, or scars and thyroid normal to palpation  RESP: lungs clear to auscultation - no rales, rhonchi or wheezes  CV: regular rate and rhythm, normal S1 S2, no S3 or S4, no murmur, click or rub, no peripheral edema and peripheral pulses  strong  ABDOMEN: soft, nontender, no hepatosplenomegaly, no masses and bowel sounds normal  MS: no gross musculoskeletal defects noted, no edema  SKIN: no suspicious lesions or rashes  NEURO: Normal strength and tone, mentation intact and speech normal  PSYCH: mentation appears normal, affect normal/bright    Diagnostic Test Results:  Labs reviewed in Epic    ASSESSMENT/PLAN:       ICD-10-CM    1. Encounter for preventative adult health care examination  Z00.00 Lipid panel reflex to direct LDL Fasting     CBC with platelets     Comprehensive metabolic panel (BMP + Alb, Alk Phos, ALT, AST, Total. Bili, TP)     Drug Confirmation Panel Urine with Creat - lab collect      2. Screening for HIV (human immunodeficiency virus)  Z11.4 HIV Antigen Antibody Combo      3. Need for hepatitis C screening test  Z11.59 Hepatitis C Screen Reflex to HCV RNA Quant and Genotype      4. Arm weakness  R29.898 Adult Neurology  Referral     OFFICE/OUTPT VISIT,EST,LEVL III      5. Attention deficit disorder, unspecified hyperactivity presence  F98.8 OFFICE/OUTPT VISIT,EST,LEVL III      6. Anxiety  F41.9 OFFICE/OUTPT VISIT,EST,LEVL III        Refer to neurology  - assess EMG for left arm paresthesias and weakness  Assess lab work   Continue treatment, advised for side effects     Patient has been advised of split billing requirements and indicates understanding: Yes      COUNSELING:   Reviewed preventive health counseling, as reflected in patient instructions       Regular exercise       Healthy diet/nutrition       Vision screening       Hearing screening        He reports that he has never smoked. He has never used smokeless tobacco.            Royce Mireles MD  [facility] CenterAnswers submitted by the patient for this visit:  Patient Health Questionnaire (Submitted on 8/29/2023)  If you checked off any problems, how difficult have these problems made it for you to do your work, take care of things at  home, or get along with other people?: Not difficult at all  PHQ9 TOTAL SCORE: 3

## 2023-09-11 DIAGNOSIS — F98.8 ATTENTION DEFICIT DISORDER, UNSPECIFIED HYPERACTIVITY PRESENCE: ICD-10-CM

## 2023-09-11 DIAGNOSIS — Z79.899 CONTROLLED SUBSTANCE AGREEMENT SIGNED: ICD-10-CM

## 2023-09-11 RX ORDER — DEXTROAMPHETAMINE SACCHARATE, AMPHETAMINE ASPARTATE, DEXTROAMPHETAMINE SULFATE AND AMPHETAMINE SULFATE 3.75; 3.75; 3.75; 3.75 MG/1; MG/1; MG/1; MG/1
TABLET ORAL
Qty: 120 TABLET | Refills: 0 | Status: SHIPPED | OUTPATIENT
Start: 2023-09-11 | End: 2023-10-31

## 2023-10-30 DIAGNOSIS — F98.8 ATTENTION DEFICIT DISORDER, UNSPECIFIED HYPERACTIVITY PRESENCE: ICD-10-CM

## 2023-10-30 DIAGNOSIS — Z79.899 CONTROLLED SUBSTANCE AGREEMENT SIGNED: ICD-10-CM

## 2023-10-31 RX ORDER — DEXTROAMPHETAMINE SACCHARATE, AMPHETAMINE ASPARTATE, DEXTROAMPHETAMINE SULFATE AND AMPHETAMINE SULFATE 3.75; 3.75; 3.75; 3.75 MG/1; MG/1; MG/1; MG/1
TABLET ORAL
Qty: 120 TABLET | Refills: 0 | Status: SHIPPED | OUTPATIENT
Start: 2023-10-31 | End: 2023-12-04

## 2023-12-04 DIAGNOSIS — F98.8 ATTENTION DEFICIT DISORDER, UNSPECIFIED HYPERACTIVITY PRESENCE: ICD-10-CM

## 2023-12-04 DIAGNOSIS — Z79.899 CONTROLLED SUBSTANCE AGREEMENT SIGNED: ICD-10-CM

## 2023-12-04 RX ORDER — DEXTROAMPHETAMINE SACCHARATE, AMPHETAMINE ASPARTATE, DEXTROAMPHETAMINE SULFATE AND AMPHETAMINE SULFATE 3.75; 3.75; 3.75; 3.75 MG/1; MG/1; MG/1; MG/1
TABLET ORAL
Qty: 120 TABLET | Refills: 0 | Status: SHIPPED | OUTPATIENT
Start: 2023-12-04 | End: 2024-01-18

## 2024-01-18 DIAGNOSIS — F98.8 ATTENTION DEFICIT DISORDER, UNSPECIFIED HYPERACTIVITY PRESENCE: ICD-10-CM

## 2024-01-18 DIAGNOSIS — Z79.899 CONTROLLED SUBSTANCE AGREEMENT SIGNED: ICD-10-CM

## 2024-01-18 RX ORDER — DEXTROAMPHETAMINE SACCHARATE, AMPHETAMINE ASPARTATE, DEXTROAMPHETAMINE SULFATE AND AMPHETAMINE SULFATE 3.75; 3.75; 3.75; 3.75 MG/1; MG/1; MG/1; MG/1
TABLET ORAL
Qty: 120 TABLET | Refills: 0 | Status: SHIPPED | OUTPATIENT
Start: 2024-01-18 | End: 2024-02-27

## 2024-01-22 NOTE — TELEPHONE ENCOUNTER
Called and left patient a voice mail message on January 22, 2024 11:36 AM to call back the clinic.    Sent [employer] message to patient.    Thank you,  Diony Rea, Triage RN [facility]raine Chen  11:36 AM 1/22/2024

## 2024-02-26 DIAGNOSIS — Z79.899 CONTROLLED SUBSTANCE AGREEMENT SIGNED: ICD-10-CM

## 2024-02-26 DIAGNOSIS — F98.8 ATTENTION DEFICIT DISORDER, UNSPECIFIED HYPERACTIVITY PRESENCE: ICD-10-CM

## 2024-02-27 RX ORDER — DEXTROAMPHETAMINE SACCHARATE, AMPHETAMINE ASPARTATE, DEXTROAMPHETAMINE SULFATE AND AMPHETAMINE SULFATE 3.75; 3.75; 3.75; 3.75 MG/1; MG/1; MG/1; MG/1
TABLET ORAL
Qty: 120 TABLET | Refills: 0 | Status: SHIPPED | OUTPATIENT
Start: 2024-02-27 | End: 2024-04-15

## 2024-04-15 DIAGNOSIS — F98.8 ATTENTION DEFICIT DISORDER, UNSPECIFIED HYPERACTIVITY PRESENCE: ICD-10-CM

## 2024-04-15 DIAGNOSIS — Z79.899 CONTROLLED SUBSTANCE AGREEMENT SIGNED: ICD-10-CM

## 2024-04-15 RX ORDER — DEXTROAMPHETAMINE SACCHARATE, AMPHETAMINE ASPARTATE, DEXTROAMPHETAMINE SULFATE AND AMPHETAMINE SULFATE 3.75; 3.75; 3.75; 3.75 MG/1; MG/1; MG/1; MG/1
TABLET ORAL
Qty: 120 TABLET | Refills: 0 | Status: SHIPPED | OUTPATIENT
Start: 2024-04-15 | End: 2024-04-16

## 2024-04-16 ENCOUNTER — TELEPHONE (OUTPATIENT)
Dept: INTERNAL MEDICINE | Facility: CLINIC | Age: 34
End: 2024-04-16

## 2024-04-16 ENCOUNTER — VIRTUAL VISIT (OUTPATIENT)
Dept: INTERNAL MEDICINE | Facility: CLINIC | Age: 34
End: 2024-04-16
Payer: COMMERCIAL

## 2024-04-16 DIAGNOSIS — F98.8 ATTENTION DEFICIT DISORDER, UNSPECIFIED HYPERACTIVITY PRESENCE: Primary | ICD-10-CM

## 2024-04-16 DIAGNOSIS — F33.0 MILD EPISODE OF RECURRENT MAJOR DEPRESSIVE DISORDER (H): ICD-10-CM

## 2024-04-16 DIAGNOSIS — F41.9 ANXIETY: ICD-10-CM

## 2024-04-16 DIAGNOSIS — Z79.899 CONTROLLED SUBSTANCE AGREEMENT SIGNED: ICD-10-CM

## 2024-04-16 DIAGNOSIS — L70.0 ACNE VULGARIS: ICD-10-CM

## 2024-04-16 PROCEDURE — 99214 OFFICE O/P EST MOD 30 MIN: CPT | Mod: 95 | Performed by: NURSE PRACTITIONER

## 2024-04-16 RX ORDER — DEXTROAMPHETAMINE SACCHARATE, AMPHETAMINE ASPARTATE, DEXTROAMPHETAMINE SULFATE AND AMPHETAMINE SULFATE 3.75; 3.75; 3.75; 3.75 MG/1; MG/1; MG/1; MG/1
30 TABLET ORAL 2 TIMES DAILY
Qty: 120 TABLET | Refills: 0 | Status: SHIPPED | OUTPATIENT
Start: 2024-04-16 | End: 2024-07-16

## 2024-04-16 RX ORDER — SERTRALINE HYDROCHLORIDE 100 MG/1
100 TABLET, FILM COATED ORAL DAILY
Qty: 90 TABLET | Refills: 3 | Status: SHIPPED | OUTPATIENT
Start: 2024-04-16

## 2024-04-16 RX ORDER — ADAPALENE 0.1 G/100G
CREAM TOPICAL AT BEDTIME
Qty: 45 G | Refills: 11 | Status: SHIPPED | OUTPATIENT
Start: 2024-04-16

## 2024-04-16 RX ORDER — CLINDAMYCIN PHOSPHATE 10 UG/ML
LOTION TOPICAL 2 TIMES DAILY
Qty: 60 ML | Refills: 11 | Status: SHIPPED | OUTPATIENT
Start: 2024-04-16

## 2024-04-16 ASSESSMENT — ANXIETY QUESTIONNAIRES
4. TROUBLE RELAXING: NOT AT ALL
7. FEELING AFRAID AS IF SOMETHING AWFUL MIGHT HAPPEN: NOT AT ALL
2. NOT BEING ABLE TO STOP OR CONTROL WORRYING: NOT AT ALL
IF YOU CHECKED OFF ANY PROBLEMS ON THIS QUESTIONNAIRE, HOW DIFFICULT HAVE THESE PROBLEMS MADE IT FOR YOU TO DO YOUR WORK, TAKE CARE OF THINGS AT HOME, OR GET ALONG WITH OTHER PEOPLE: NOT DIFFICULT AT ALL
GAD7 TOTAL SCORE: 0
3. WORRYING TOO MUCH ABOUT DIFFERENT THINGS: NOT AT ALL
1. FEELING NERVOUS, ANXIOUS, OR ON EDGE: NOT AT ALL
5. BEING SO RESTLESS THAT IT IS HARD TO SIT STILL: NOT AT ALL
3. WORRYING TOO MUCH ABOUT DIFFERENT THINGS: NOT AT ALL
1. FEELING NERVOUS, ANXIOUS, OR ON EDGE: NOT AT ALL
GAD7 TOTAL SCORE: 0
GAD7 TOTAL SCORE: 0
5. BEING SO RESTLESS THAT IT IS HARD TO SIT STILL: NOT AT ALL
7. FEELING AFRAID AS IF SOMETHING AWFUL MIGHT HAPPEN: NOT AT ALL
8. IF YOU CHECKED OFF ANY PROBLEMS, HOW DIFFICULT HAVE THESE MADE IT FOR YOU TO DO YOUR WORK, TAKE CARE OF THINGS AT HOME, OR GET ALONG WITH OTHER PEOPLE?: NOT DIFFICULT AT ALL
2. NOT BEING ABLE TO STOP OR CONTROL WORRYING: NOT AT ALL
7. FEELING AFRAID AS IF SOMETHING AWFUL MIGHT HAPPEN: NOT AT ALL
6. BECOMING EASILY ANNOYED OR IRRITABLE: NOT AT ALL
IF YOU CHECKED OFF ANY PROBLEMS ON THIS QUESTIONNAIRE, HOW DIFFICULT HAVE THESE PROBLEMS MADE IT FOR YOU TO DO YOUR WORK, TAKE CARE OF THINGS AT HOME, OR GET ALONG WITH OTHER PEOPLE: NOT DIFFICULT AT ALL
GAD7 TOTAL SCORE: 0
6. BECOMING EASILY ANNOYED OR IRRITABLE: NOT AT ALL

## 2024-04-16 ASSESSMENT — PATIENT HEALTH QUESTIONNAIRE - PHQ9
SUM OF ALL RESPONSES TO PHQ QUESTIONS 1-9: 1
5. POOR APPETITE OR OVEREATING: NOT AT ALL
SUM OF ALL RESPONSES TO PHQ QUESTIONS 1-9: 1
10. IF YOU CHECKED OFF ANY PROBLEMS, HOW DIFFICULT HAVE THESE PROBLEMS MADE IT FOR YOU TO DO YOUR WORK, TAKE CARE OF THINGS AT HOME, OR GET ALONG WITH OTHER PEOPLE: NOT DIFFICULT AT ALL

## 2024-04-16 NOTE — PROGRESS NOTES
Nate is a 33 year old who is being evaluated via a billable video visit.    How would you like to obtain your AVS? MyChart  If the video visit is dropped, the invitation should be resent by: Text to cell phone: [phone]  Will anyone else be joining your video visit? No      Assessment & Plan     Attention deficit disorder, unspecified hyperactivity presence  Doing well on current dose   Needs urine tox prior to next refill   Needs to sign CSA   - Drug Confirmation Panel Urine with Creat - lab collect; Future  - sertraline (ZOLOFT) 100 MG tablet; Take 1 tablet (100 mg) by mouth daily ### DO NOT FILL NOW.  Please update patient's profile to reflect additional refills.  ####  - amphetamine-dextroamphetamine (ADDERALL) 15 MG tablet; Take 2 tablets (30 mg) by mouth 2 times daily    Controlled substance agreement signed    - amphetamine-dextroamphetamine (ADDERALL) 15 MG tablet; Take 2 tablets (30 mg) by mouth 2 times daily    Mild episode of recurrent major depressive disorder (H24)  Stable on medication   - sertraline (ZOLOFT) 100 MG tablet; Take 1 tablet (100 mg) by mouth daily ### DO NOT FILL NOW.  Please update patient's profile to reflect additional refills.  ####    Anxiety  Stableon medication   - sertraline (ZOLOFT) 100 MG tablet; Take 1 tablet (100 mg) by mouth daily ### DO NOT FILL NOW.  Please update patient's profile to reflect additional refills.  ####    Acne vulgaris  Medication helps with acne   - clindamycin (CLEOCIN T) 1 % external lotion; Apply topically 2 times daily ### DO NOT FILL NOW.  Please update patient's profile to reflect additional refills.  ####  - adapalene (DIFFERIN) 0.1 % external cream; Apply topically at bedtime ### DO NOT FILL NOW.  Please update patient's profile to reflect additional refills.  ####      12 minutes spent by me on the date of the encounter doing chart review, history and exam, documentation and further activities per the note        Patient Instructions   Please  sign the controlled substance agreement    You need to do a urine tox screen    Medication refilled     Domonique Sullivan is a 33 year old, presenting for the following health issues:  Recheck Medication        4/16/2024    10:24 AM   Additional Questions   Roomed by Lavern VELEZ     HPI         8/29/2023     7:50 AM 4/16/2024    12:38 PM 4/16/2024     4:25 PM   PHQ   PHQ-9 Total Score 3 2 1   Q9: Thoughts of better off dead/self-harm past 2 weeks Not at all Not at all Not at all         1/25/2023    10:23 AM 4/16/2024    12:38 PM 4/16/2024     4:25 PM   LUIS MANUEL-7 SCORE   Total Score   0 (minimal anxiety)   Total Score 2 0 0     Zoloft     Adderall  Needs urine tox   2/27/2024 last filled     CSA 2/2023          Review of Systems  Constitutional, neuro, ENT, endocrine, pulmonary, cardiac, gastrointestinal, genitourinary, musculoskeletal, integument and psychiatric systems are negative, except as otherwise noted.      Objective    Vitals - Patient Reported  Pulse (Patient Reported): 75      Vitals:  No vitals were obtained today due to virtual visit.    Physical Exam   GENERAL: alert and no distress  EYES: Eyes grossly normal to inspection.  No discharge or erythema, or obvious scleral/conjunctival abnormalities.  RESP: No audible wheeze, cough, or visible cyanosis.    SKIN: Visible skin clear. No significant rash, abnormal pigmentation or lesions.  NEURO: Cranial nerves grossly intact.  Mentation and speech appropriate for age.  PSYCH: Appropriate affect, tone, and pace of words    Urine tox        Video-Visit Details    Type of service:  Video Visit   Originating Location (pt. Location): Home    Distant Location (provider location):  On-site  Platform used for Video Visit: Doximanat  Signed Electronically by: TUYET Welch CNP

## 2024-04-16 NOTE — PATIENT INSTRUCTIONS
Please sign the controlled substance agreement    You need to do a urine tox screen    Medication refilled   
Quality 431: Preventive Care And Screening: Unhealthy Alcohol Use - Screening: Patient not identified as an unhealthy alcohol user when screened for unhealthy alcohol use using a systematic screening method
Quality 110: Preventive Care And Screening: Influenza Immunization: Influenza Immunization Administered during Influenza season
Detail Level: Detailed
Quality 111:Pneumonia Vaccination Status For Older Adults: Pneumococcal vaccine administered on or after patient’s 60th birthday and before the end of the measurement period
Quality 130: Documentation Of Current Medications In The Medical Record: Current Medications Documented
Quality 402: Tobacco Use And Help With Quitting Among Adolescents: Patient screened for tobacco and never smoked

## 2024-04-16 NOTE — LETTER
[facility]  04/16/24  Patient: Wander Dinero  YOB: 1990  Medical Record Number: [id number]                                                                                  Non-Opioid Controlled Substance Agreement    This is an agreement between you and your provider regarding safe and appropriate use of controlled substances prescribed by your care team. Controlled substances are?medicines that can cause physical and mental dependence (abuse).     There are strict laws about having and using these medicines. We here at [facility] are  committed to working with you in your efforts to get better. To support you in this work, we'll help you schedule regular office appointments for medicine refills. If we must cancel or change your appointment for any reason, we'll make sure you have enough medicine to last until your next appointment.     As a Provider, I will:   Listen carefully to your concerns while treating you with respect.   Recommend a treatment plan that I believe is in your best interest and may involve therapies other than medicine.    Talk with you often about the possible benefits and the risk of harm of any medicine that we prescribe for you.  Assess the safety of this medicine and check how well it works.    Provide a plan on how to taper (discontinue or go off) using this medicine if the decision is made to stop its use.      ::  As a Patient, I understand controlled substances:     Are prescribed by my care provider to help me function or work and manage my condition(s).?  Are strong medicines and can cause serious side effects.     Need to be taken exactly as prescribed.?Combining controlled substances with certain medicines or chemicals (such as illegal drugs, alcohol, sedatives, sleeping pills, and benzodiazepines) can be dangerous or even fatal.? If I stop taking my medicines suddenly, I may have severe withdrawal symptoms.     The risks, benefits, and  side effects of these medicine(s) were explained to me. I agree that:    I will take part in other treatments as advised by my care team. This may be psychiatry or counseling, physical therapy, behavioral therapy, group treatment or a referral to specialist.    I will keep all my appointments and understand this is part of the monitoring of controlled substances.?My care team may require an office visit for EVERY controlled substance refill. If I miss appointments or don t follow instructions, my care team may stop my medicine    I will take my medicines as prescribed. I will not change the dose or schedule unless my care team tells me to. There will be no refills if I run out early.      I may be asked to come to the clinic and complete a urine drug test or complete a pill count. If I don t give a urine sample or participate in a pill count, the care team may stop my medicine.    I will only receive controlled substance prescriptions from this clinic. If I am treated by another provider, I will tell them that I am taking controlled substances and that I have a treatment agreement with this provider. I will inform my [facility] care team within one business day if I am given a prescription for any controlled substance by another healthcare provider. My [facility] care team can contact other providers and pharmacists about my use of any medicines.    It is up to me to make sure that I don't run out of my medicines on weekends or holidays.?If my care team is willing to refill my prescription without a visit, I must request refills only during office hours. Refills may take up to 3 business days to process. I will use one pharmacy to fill all my controlled substance prescriptions. I will notify the clinic about any changes to my insurance or medicine availability.    I am responsible for my prescriptions. If the medicine/prescription is lost, stolen or destroyed, it will not be replaced.?I also agree not  to share controlled substance medicines with anyone.     I am aware I should not use any illegal or recreational drugs. I agree not to drink alcohol unless my care team says I can.     If I enroll in the [place] Medical Cannabis program, I will tell my care team before my next refill.    I will tell my care team right away if I become pregnant, have a new medical problem treated outside of my regular clinic, or have a change in my medicines.     I understand that this medicine can affect my thinking, judgment and reaction time.? Alcohol and drugs affect the brain and body, which can affect the safety of my driving. Being under the influence of alcohol or drugs can affect my decision-making, behaviors, personal safety and the safety of others. Driving while impaired (DWI) can occur if a person is driving, operating or in physical control of a car, motorcycle, boat, snowmobile, ATV, motorbike, off-road vehicle or any other motor vehicle ([place] Statute 169[place].20). I understand the risk if I choose to drive or operate any vehicle or machinery.    I understand that if I do not follow any of the conditions above, my prescriptions or treatment may be stopped or changed.   I agree that my provider, clinic care team and pharmacy may work with any city, state or federal law enforcement agency that investigates the misuse, sale or other diversion of my controlled medicine. I will allow my provider to discuss my care with, or share a copy of, this agreement with any other treating provider, pharmacy or emergency room where I receive care.     I have read this agreement and have asked questions about anything I did not understand.    ________________________________________________________  Patient Signature - Wander Dinero     ___________________                   Date     ________________________________________________________  Provider Signature - TUYET Welch CNP       ___________________                   Date      ________________________________________________________  Witness Signature (required if provider not present while patient signing)          ___________________                   Date

## 2024-06-07 ENCOUNTER — LAB (OUTPATIENT)
Dept: LAB | Facility: CLINIC | Age: 34
End: 2024-06-07
Payer: COMMERCIAL

## 2024-06-07 DIAGNOSIS — F98.8 ATTENTION DEFICIT DISORDER, UNSPECIFIED HYPERACTIVITY PRESENCE: ICD-10-CM

## 2024-06-07 LAB — CREAT UR-MCNC: 219 MG/DL

## 2024-06-07 PROCEDURE — G0481 DRUG TEST DEF 8-14 CLASSES: HCPCS | Performed by: INTERNAL MEDICINE

## 2024-06-11 LAB
AMPHET UR CFM-MCNC: 6080 NG/ML
AMPHET/CREAT UR: 2776 NG/MG {CREAT}

## 2024-07-16 DIAGNOSIS — F98.8 ATTENTION DEFICIT DISORDER, UNSPECIFIED HYPERACTIVITY PRESENCE: ICD-10-CM

## 2024-07-16 DIAGNOSIS — Z79.899 CONTROLLED SUBSTANCE AGREEMENT SIGNED: ICD-10-CM

## 2024-07-16 RX ORDER — DEXTROAMPHETAMINE SACCHARATE, AMPHETAMINE ASPARTATE, DEXTROAMPHETAMINE SULFATE AND AMPHETAMINE SULFATE 3.75; 3.75; 3.75; 3.75 MG/1; MG/1; MG/1; MG/1
30 TABLET ORAL 2 TIMES DAILY
Qty: 120 TABLET | Refills: 0 | Status: SHIPPED | OUTPATIENT
Start: 2024-07-16 | End: 2024-08-28

## 2024-07-30 ENCOUNTER — PATIENT OUTREACH (OUTPATIENT)
Dept: CARE COORDINATION | Facility: CLINIC | Age: 34
End: 2024-07-30
Payer: COMMERCIAL

## 2024-08-13 ENCOUNTER — PATIENT OUTREACH (OUTPATIENT)
Dept: CARE COORDINATION | Facility: CLINIC | Age: 34
End: 2024-08-13
Payer: COMMERCIAL

## 2024-08-28 DIAGNOSIS — F98.8 ATTENTION DEFICIT DISORDER, UNSPECIFIED HYPERACTIVITY PRESENCE: ICD-10-CM

## 2024-08-28 DIAGNOSIS — Z79.899 CONTROLLED SUBSTANCE AGREEMENT SIGNED: ICD-10-CM

## 2024-08-28 RX ORDER — DEXTROAMPHETAMINE SACCHARATE, AMPHETAMINE ASPARTATE, DEXTROAMPHETAMINE SULFATE AND AMPHETAMINE SULFATE 3.75; 3.75; 3.75; 3.75 MG/1; MG/1; MG/1; MG/1
30 TABLET ORAL 2 TIMES DAILY
Qty: 120 TABLET | Refills: 0 | Status: SHIPPED | OUTPATIENT
Start: 2024-08-28

## 2024-10-16 DIAGNOSIS — F98.8 ATTENTION DEFICIT DISORDER: ICD-10-CM

## 2024-10-16 DIAGNOSIS — Z79.899 CONTROLLED SUBSTANCE AGREEMENT SIGNED: ICD-10-CM

## 2024-10-16 RX ORDER — DEXTROAMPHETAMINE SACCHARATE, AMPHETAMINE ASPARTATE, DEXTROAMPHETAMINE SULFATE AND AMPHETAMINE SULFATE 3.75; 3.75; 3.75; 3.75 MG/1; MG/1; MG/1; MG/1
30 TABLET ORAL 2 TIMES DAILY
Qty: 120 TABLET | Refills: 0 | Status: SHIPPED | OUTPATIENT
Start: 2024-10-16

## 2024-11-30 ENCOUNTER — HEALTH MAINTENANCE LETTER (OUTPATIENT)
Age: 34
End: 2024-11-30

## 2024-12-04 DIAGNOSIS — F98.8 ATTENTION DEFICIT DISORDER: ICD-10-CM

## 2024-12-04 DIAGNOSIS — Z79.899 CONTROLLED SUBSTANCE AGREEMENT SIGNED: ICD-10-CM

## 2024-12-04 RX ORDER — DEXTROAMPHETAMINE SACCHARATE, AMPHETAMINE ASPARTATE, DEXTROAMPHETAMINE SULFATE AND AMPHETAMINE SULFATE 3.75; 3.75; 3.75; 3.75 MG/1; MG/1; MG/1; MG/1
30 TABLET ORAL 2 TIMES DAILY
Qty: 120 TABLET | Refills: 0 | OUTPATIENT
Start: 2024-12-04

## 2024-12-04 NOTE — TELEPHONE ENCOUNTER
Overdue for an appointment he has to be seen every 6 months for this medicine and he was last seen in April 2024    There are no upcoming appointments scheduled

## 2025-01-24 DIAGNOSIS — Z79.899 CONTROLLED SUBSTANCE AGREEMENT SIGNED: ICD-10-CM

## 2025-01-24 DIAGNOSIS — F90.9 ATTENTION DEFICIT HYPERACTIVITY DISORDER (ADHD), UNSPECIFIED ADHD TYPE: ICD-10-CM

## 2025-01-26 RX ORDER — DEXTROAMPHETAMINE SACCHARATE, AMPHETAMINE ASPARTATE, DEXTROAMPHETAMINE SULFATE AND AMPHETAMINE SULFATE 3.75; 3.75; 3.75; 3.75 MG/1; MG/1; MG/1; MG/1
30 TABLET ORAL 2 TIMES DAILY
Qty: 120 TABLET | Refills: 0 | Status: SHIPPED | OUTPATIENT
Start: 2025-01-26

## 2025-02-25 ENCOUNTER — PATIENT OUTREACH (OUTPATIENT)
Dept: CARE COORDINATION | Facility: CLINIC | Age: 35
End: 2025-02-25
Payer: COMMERCIAL

## 2025-03-17 DIAGNOSIS — F90.9 ATTENTION DEFICIT HYPERACTIVITY DISORDER (ADHD), UNSPECIFIED ADHD TYPE: ICD-10-CM

## 2025-03-17 DIAGNOSIS — Z79.899 CONTROLLED SUBSTANCE AGREEMENT SIGNED: ICD-10-CM

## 2025-03-18 RX ORDER — DEXTROAMPHETAMINE SACCHARATE, AMPHETAMINE ASPARTATE, DEXTROAMPHETAMINE SULFATE AND AMPHETAMINE SULFATE 3.75; 3.75; 3.75; 3.75 MG/1; MG/1; MG/1; MG/1
30 TABLET ORAL 2 TIMES DAILY
Qty: 120 TABLET | Refills: 0 | Status: SHIPPED | OUTPATIENT
Start: 2025-03-18

## 2025-05-05 DIAGNOSIS — F90.9 ATTENTION DEFICIT HYPERACTIVITY DISORDER (ADHD), UNSPECIFIED ADHD TYPE: ICD-10-CM

## 2025-05-05 DIAGNOSIS — Z79.899 CONTROLLED SUBSTANCE AGREEMENT SIGNED: ICD-10-CM

## 2025-05-05 RX ORDER — DEXTROAMPHETAMINE SACCHARATE, AMPHETAMINE ASPARTATE, DEXTROAMPHETAMINE SULFATE AND AMPHETAMINE SULFATE 3.75; 3.75; 3.75; 3.75 MG/1; MG/1; MG/1; MG/1
30 TABLET ORAL 2 TIMES DAILY
Qty: 120 TABLET | Refills: 0 | Status: SHIPPED | OUTPATIENT
Start: 2025-05-05

## 2025-06-25 DIAGNOSIS — F90.9 ATTENTION DEFICIT HYPERACTIVITY DISORDER (ADHD), UNSPECIFIED ADHD TYPE: ICD-10-CM

## 2025-06-25 DIAGNOSIS — Z79.899 CONTROLLED SUBSTANCE AGREEMENT SIGNED: ICD-10-CM

## 2025-06-25 RX ORDER — DEXTROAMPHETAMINE SACCHARATE, AMPHETAMINE ASPARTATE, DEXTROAMPHETAMINE SULFATE AND AMPHETAMINE SULFATE 3.75; 3.75; 3.75; 3.75 MG/1; MG/1; MG/1; MG/1
30 TABLET ORAL 2 TIMES DAILY
Qty: 120 TABLET | Refills: 0 | Status: SHIPPED | OUTPATIENT
Start: 2025-06-25

## 2025-06-25 NOTE — TELEPHONE ENCOUNTER
Due for appointment   Sign CSA and urine tox   We can mail CSA   These need to be in place for next refill

## 2025-06-25 NOTE — LETTER
[facility]  06/25/25  Patient: Wander Dinero  YOB: 1990  Medical Record Number: [id number]                                                                                  Non-Opioid Controlled Substance Agreement    This is an agreement between you and your provider regarding safe and appropriate use of controlled substances prescribed by your care team. Controlled substances are?medicines that can cause physical and mental dependence (abuse).     There are strict laws about having and using these medicines. We here at [facility] are  committed to working with you in your efforts to get better. To support you in this work, we'll help you schedule regular office appointments for medicine refills. If we must cancel or change your appointment for any reason, we'll make sure you have enough medicine to last until your next appointment.     As a Provider, I will:   Listen carefully to your concerns while treating you with respect.   Recommend a treatment plan that I believe is in your best interest and may involve therapies other than medicine.    Talk with you often about the possible benefits and the risk of harm of any medicine that we prescribe for you.  Assess the safety of this medicine and check how well it works.    Provide a plan on how to taper (discontinue or go off) using this medicine if the decision is made to stop its use.      ::  As a Patient, I understand controlled substances:     Are prescribed by my care provider to help me function or work and manage my condition(s).?  Are strong medicines and can cause serious side effects.     Need to be taken exactly as prescribed.?Combining controlled substances with certain medicines or chemicals (such as illegal drugs, alcohol, sedatives, sleeping pills, and benzodiazepines) can be dangerous or even fatal.? If I stop taking my medicines suddenly, I may have severe withdrawal symptoms.     The risks, benefits, and  side effects of these medicine(s) were explained to me. I agree that:    I will take part in other treatments as advised by my care team. This may be psychiatry or counseling, physical therapy, behavioral therapy, group treatment or a referral to specialist.    I will keep all my appointments and understand this is part of the monitoring of controlled substances.?My care team may require an office visit for EVERY controlled substance refill. If I miss appointments or don t follow instructions, my care team may stop my medicine    I will take my medicines as prescribed. I will not change the dose or schedule unless my care team tells me to. There will be no refills if I run out early.      I may be asked to come to the clinic and complete a urine drug test or complete a pill count. If I don t give a urine sample or participate in a pill count, the care team may stop my medicine.    I will only receive controlled substance prescriptions from this clinic. If I am treated by another provider, I will tell them that I am taking controlled substances and that I have a treatment agreement with this provider. I will inform my [facility] care team within one business day if I am given a prescription for any controlled substance by another healthcare provider. My [facility] care team can contact other providers and pharmacists about my use of any medicines.    It is up to me to make sure that I don't run out of my medicines on weekends or holidays.?If my care team is willing to refill my prescription without a visit, I must request refills only during office hours. Refills may take up to 3 business days to process. I will use one pharmacy to fill all my controlled substance prescriptions. I will notify the clinic about any changes to my insurance or medicine availability.    I am responsible for my prescriptions. If the medicine/prescription is lost, stolen or destroyed, it will not be replaced.?I also agree not  to share controlled substance medicines with anyone.     I am aware I should not use any illegal or recreational drugs. I agree not to drink alcohol unless my care team says I can.     If I enroll in the [place] [facility] Cannabis program, I will tell my care team before my next refill.    I will tell my care team right away if I become pregnant, have a new medical problem treated outside of my regular clinic, or have a change in my medicines.     I understand that this medicine can affect my thinking, judgment and reaction time.? Alcohol and drugs affect the brain and body, which can affect the safety of my driving. Being under the influence of alcohol or drugs can affect my decision-making, behaviors, personal safety and the safety of others. Driving while impaired (DWI) can occur if a person is driving, operating or in physical control of a car, motorcycle, boat, snowmobile, ATV, motorbike, off-road vehicle or any other motor vehicle ([place] Statute 169A.20). I understand the risk if I choose to drive or operate any vehicle or machinery.    I understand that if I do not follow any of the conditions above, my prescriptions or treatment may be stopped or changed.   I agree that my provider, clinic care team and pharmacy may work with any city, state or federal law enforcement agency that investigates the misuse, sale or other diversion of my controlled medicine. I will allow my provider to discuss my care with, or share a copy of, this agreement with any other treating provider, pharmacy or emergency room where I receive care.     I have read this agreement and have asked questions about anything I did not understand.    ________________________________________________________  Patient Signature - Wander Dinero     ___________________                   Date     ________________________________________________________  Provider Signature - TUYET Welch CNP       ___________________                   Date      ________________________________________________________  Witness Signature (required if provider not present while patient signing)          ___________________                   Date

## 2025-08-05 DIAGNOSIS — Z79.899 CONTROLLED SUBSTANCE AGREEMENT SIGNED: ICD-10-CM

## 2025-08-05 DIAGNOSIS — F90.9 ATTENTION DEFICIT HYPERACTIVITY DISORDER (ADHD), UNSPECIFIED ADHD TYPE: ICD-10-CM

## 2025-08-06 RX ORDER — DEXTROAMPHETAMINE SACCHARATE, AMPHETAMINE ASPARTATE, DEXTROAMPHETAMINE SULFATE AND AMPHETAMINE SULFATE 3.75; 3.75; 3.75; 3.75 MG/1; MG/1; MG/1; MG/1
30 TABLET ORAL 2 TIMES DAILY
Qty: 120 TABLET | Refills: 0 | Status: SHIPPED | OUTPATIENT
Start: 2025-08-06